# Patient Record
Sex: FEMALE | Race: WHITE | NOT HISPANIC OR LATINO | Employment: OTHER | URBAN - METROPOLITAN AREA
[De-identification: names, ages, dates, MRNs, and addresses within clinical notes are randomized per-mention and may not be internally consistent; named-entity substitution may affect disease eponyms.]

---

## 2023-04-12 PROBLEM — U07.1 CLINICAL DIAGNOSIS OF COVID-19: Status: ACTIVE | Noted: 2023-04-12

## 2023-04-12 PROBLEM — C44.311 BASAL CELL CARCINOMA (BCC) OF LEFT SIDE OF NOSE: Status: ACTIVE | Noted: 2023-04-12

## 2023-04-12 PROBLEM — D12.6 TUBULAR ADENOMA OF COLON: Status: ACTIVE | Noted: 2023-04-12

## 2023-04-12 PROBLEM — C44.622 SQUAMOUS CELL CARCINOMA OF RIGHT UPPER EXTREMITY: Status: ACTIVE | Noted: 2023-04-12

## 2023-04-12 PROBLEM — D37.8 NEOPLASM OF UNCERTAIN BEHAVIOR OF TAIL OF PANCREAS: Status: ACTIVE | Noted: 2023-04-12

## 2023-04-12 PROBLEM — R19.5 POSITIVE COLORECTAL CANCER SCREENING USING COLOGUARD TEST: Status: ACTIVE | Noted: 2023-04-12

## 2023-04-12 PROBLEM — G40.909 SEIZURE DISORDER (MULTI): Status: ACTIVE | Noted: 2023-04-12

## 2023-04-12 PROBLEM — M89.9 DISORDER OF BONE: Status: ACTIVE | Noted: 2023-04-12

## 2023-04-12 PROBLEM — D49.0 IPMN (INTRADUCTAL PAPILLARY MUCINOUS NEOPLASM): Status: ACTIVE | Noted: 2023-04-12

## 2023-04-12 PROBLEM — C91.11: Status: ACTIVE | Noted: 2023-04-12

## 2023-04-12 PROBLEM — G25.0 ESSENTIAL TREMOR: Status: ACTIVE | Noted: 2023-04-12

## 2023-04-12 PROBLEM — E66.3 OVERWEIGHT WITH BODY MASS INDEX (BMI) OF 28 TO 28.9 IN ADULT: Status: ACTIVE | Noted: 2023-04-12

## 2023-04-12 RX ORDER — DIVALPROEX SODIUM 500 MG/1
1 TABLET, DELAYED RELEASE ORAL DAILY
COMMUNITY
End: 2023-10-13 | Stop reason: SDUPTHER

## 2023-04-13 ENCOUNTER — OFFICE VISIT (OUTPATIENT)
Dept: PRIMARY CARE | Facility: CLINIC | Age: 73
End: 2023-04-13
Payer: MEDICARE

## 2023-04-13 VITALS
HEART RATE: 68 BPM | HEIGHT: 67 IN | WEIGHT: 218 LBS | DIASTOLIC BLOOD PRESSURE: 60 MMHG | BODY MASS INDEX: 34.21 KG/M2 | SYSTOLIC BLOOD PRESSURE: 104 MMHG | RESPIRATION RATE: 18 BRPM

## 2023-04-13 DIAGNOSIS — E66.09 CLASS 1 OBESITY DUE TO EXCESS CALORIES WITH SERIOUS COMORBIDITY AND BODY MASS INDEX (BMI) OF 34.0 TO 34.9 IN ADULT: ICD-10-CM

## 2023-04-13 DIAGNOSIS — G40.909 SEIZURE DISORDER (MULTI): ICD-10-CM

## 2023-04-13 DIAGNOSIS — D49.0 IPMN (INTRADUCTAL PAPILLARY MUCINOUS NEOPLASM): ICD-10-CM

## 2023-04-13 DIAGNOSIS — Z12.31 ENCOUNTER FOR SCREENING MAMMOGRAM FOR BREAST CANCER: ICD-10-CM

## 2023-04-13 DIAGNOSIS — E66.3 OVERWEIGHT WITH BODY MASS INDEX (BMI) OF 28 TO 28.9 IN ADULT: ICD-10-CM

## 2023-04-13 DIAGNOSIS — Z00.00 ROUTINE GENERAL MEDICAL EXAMINATION AT HEALTH CARE FACILITY: ICD-10-CM

## 2023-04-13 DIAGNOSIS — D12.6 TUBULAR ADENOMA OF COLON: ICD-10-CM

## 2023-04-13 DIAGNOSIS — G25.0 ESSENTIAL TREMOR: ICD-10-CM

## 2023-04-13 DIAGNOSIS — C91.11 CHRONIC LYMPHOCYTIC LEUKEMIA IN REMISSION (MULTI): ICD-10-CM

## 2023-04-13 DIAGNOSIS — Z00.00 MEDICARE ANNUAL WELLNESS VISIT, SUBSEQUENT: Primary | ICD-10-CM

## 2023-04-13 PROBLEM — E66.811 CLASS 1 OBESITY DUE TO EXCESS CALORIES WITH SERIOUS COMORBIDITY AND BODY MASS INDEX (BMI) OF 34.0 TO 34.9 IN ADULT: Status: ACTIVE | Noted: 2023-04-13

## 2023-04-13 PROBLEM — D37.8 NEOPLASM OF UNCERTAIN BEHAVIOR OF TAIL OF PANCREAS: Status: RESOLVED | Noted: 2023-04-12 | Resolved: 2023-04-13

## 2023-04-13 PROBLEM — M89.9 DISORDER OF BONE: Status: RESOLVED | Noted: 2023-04-12 | Resolved: 2023-04-13

## 2023-04-13 PROCEDURE — 99214 OFFICE O/P EST MOD 30 MIN: CPT | Performed by: INTERNAL MEDICINE

## 2023-04-13 PROCEDURE — 1170F FXNL STATUS ASSESSED: CPT | Performed by: INTERNAL MEDICINE

## 2023-04-13 PROCEDURE — 3008F BODY MASS INDEX DOCD: CPT | Performed by: INTERNAL MEDICINE

## 2023-04-13 PROCEDURE — 1159F MED LIST DOCD IN RCRD: CPT | Performed by: INTERNAL MEDICINE

## 2023-04-13 PROCEDURE — 1036F TOBACCO NON-USER: CPT | Performed by: INTERNAL MEDICINE

## 2023-04-13 PROCEDURE — G0442 ANNUAL ALCOHOL SCREEN 15 MIN: HCPCS | Performed by: INTERNAL MEDICINE

## 2023-04-13 PROCEDURE — G0439 PPPS, SUBSEQ VISIT: HCPCS | Performed by: INTERNAL MEDICINE

## 2023-04-13 PROCEDURE — 1160F RVW MEDS BY RX/DR IN RCRD: CPT | Performed by: INTERNAL MEDICINE

## 2023-04-13 PROCEDURE — 99497 ADVNCD CARE PLAN 30 MIN: CPT | Performed by: INTERNAL MEDICINE

## 2023-04-13 PROCEDURE — G0444 DEPRESSION SCREEN ANNUAL: HCPCS | Performed by: INTERNAL MEDICINE

## 2023-04-13 PROCEDURE — 99397 PER PM REEVAL EST PAT 65+ YR: CPT | Performed by: INTERNAL MEDICINE

## 2023-04-13 PROCEDURE — G0447 BEHAVIOR COUNSEL OBESITY 15M: HCPCS | Performed by: INTERNAL MEDICINE

## 2023-04-13 ASSESSMENT — ANXIETY QUESTIONNAIRES
3. WORRYING TOO MUCH ABOUT DIFFERENT THINGS: NOT AT ALL
GAD7 TOTAL SCORE: 0
5. BEING SO RESTLESS THAT IT IS HARD TO SIT STILL: NOT AT ALL
1. FEELING NERVOUS, ANXIOUS, OR ON EDGE: NOT AT ALL
IF YOU CHECKED OFF ANY PROBLEMS ON THIS QUESTIONNAIRE, HOW DIFFICULT HAVE THESE PROBLEMS MADE IT FOR YOU TO DO YOUR WORK, TAKE CARE OF THINGS AT HOME, OR GET ALONG WITH OTHER PEOPLE: NOT DIFFICULT AT ALL
2. NOT BEING ABLE TO STOP OR CONTROL WORRYING: NOT AT ALL
6. BECOMING EASILY ANNOYED OR IRRITABLE: NOT AT ALL
4. TROUBLE RELAXING: NOT AT ALL
7. FEELING AFRAID AS IF SOMETHING AWFUL MIGHT HAPPEN: NOT AT ALL

## 2023-04-13 ASSESSMENT — ACTIVITIES OF DAILY LIVING (ADL)
DOING_HOUSEWORK: INDEPENDENT
BATHING: INDEPENDENT
TAKING_MEDICATION: INDEPENDENT
MANAGING_FINANCES: INDEPENDENT
DRESSING: INDEPENDENT
GROCERY_SHOPPING: INDEPENDENT

## 2023-04-13 ASSESSMENT — ENCOUNTER SYMPTOMS
LOSS OF SENSATION IN FEET: 0
DEPRESSION: 0
OCCASIONAL FEELINGS OF UNSTEADINESS: 0

## 2023-04-13 ASSESSMENT — PATIENT HEALTH QUESTIONNAIRE - PHQ9
SUM OF ALL RESPONSES TO PHQ9 QUESTIONS 1 AND 2: 0
2. FEELING DOWN, DEPRESSED OR HOPELESS: NOT AT ALL
1. LITTLE INTEREST OR PLEASURE IN DOING THINGS: NOT AT ALL

## 2023-04-13 NOTE — ASSESSMENT & PLAN NOTE
CBC and evaluation to be completed by oncologist tomorrow in office examination reveals no lymphadenopathy no B cell reported symptoms of night sweats weight loss

## 2023-04-13 NOTE — ASSESSMENT & PLAN NOTE
Dietary education given Mediterranean diet suggested 150 minutes of regular exercise consideration for intermittent fasting referral to nutritional services

## 2023-04-13 NOTE — ASSESSMENT & PLAN NOTE
Continue propranolol 80 mg 1 capsule daily consider titration of medication to improve symptoms stable at this time

## 2023-04-13 NOTE — ASSESSMENT & PLAN NOTE
Advanced medical directives up-to-date screenings for depression and alcohol completed.  Screening mammogram ordered for October up-to-date with bone density normal bone density reported 2021 continue active surveillance for colon polyp.   screening for hepatitis C.  Order written for Shingrix vaccination to be obtained at pharmacy

## 2023-04-13 NOTE — ASSESSMENT & PLAN NOTE
Follow-up with MRI of pancreas and follow-up with pancreatic surgery stable at this time asymptomatic

## 2023-04-13 NOTE — PROGRESS NOTES
"Alcohol consumption becomes hazardous when consuming women [place] over 65 years old greater than 7 drinks per week or greater than 3 drinks per occasion for men greater than 14 drinks per week or greater than 4 drinks per occasion.  I spent 15 minutes screening for alcohol use.Depression and anxiety screening completed   PHQ9 score   GAD7 score   I spent 15 minutes obtaining and discussing depression screening using PHQ 2 questions with results documented in chart.  Screening using PHQ-9 and CEILO-7 scores were used for follow-up with treatment and referral plan discussed.      I spent greater than 15 minutes face-to-face with individual providing recommendations for nutrition choices and exercise plan to help achieve weight reductionI spent greater than 15 minutes discussing advance care planning including the explanation and discussion of advanced directives.  If patient does not have current up-to-date documents examples and information provided on how to create both living will and power of .  toolkit was given to patient and was encouraged to work out completing these documents.Subjective   Reason for Visit: Reina Knowles is an 72 y.o. female here for a Medicare Wellness visit.     Past Medical, Surgical, and Family History reviewed and updated in chart.    Reviewed all medications by prescribing practitioner or clinical pharmacist (such as prescriptions, OTCs, herbal therapies and supplements) and documented in the medical record.    HPI    Patient Care Team:  Dionte Lovett DO as PCP - General  Dionte Lovett DO as PCP - [employer] PCP     Review of Systems   All other systems reviewed and are negative.      Objective   Vitals:  /60 (BP Location: Left arm)   Pulse 68   Resp 18   Ht 1.702 m (5' 7\")   Wt 98.9 kg (218 lb)   BMI 34.14 kg/m²       Physical Exam  Vitals and nursing note reviewed.   Constitutional:       General: She is not in acute distress.     Appearance: " Normal appearance. She is well-developed. She is not toxic-appearing.   HENT:      Head: Normocephalic and atraumatic.      Right Ear: Tympanic membrane and external ear normal.      Left Ear: Tympanic membrane and external ear normal.      Nose: Nose normal.      Mouth/Throat:      Mouth: Mucous membranes are moist.      Pharynx: Oropharynx is clear. No oropharyngeal exudate or posterior oropharyngeal erythema.      Tonsils: No tonsillar exudate. 2+ on the right. 2+ on the left.   Eyes:      Extraocular Movements: Extraocular movements intact.      Conjunctiva/sclera: Conjunctivae normal.   Cardiovascular:      Rate and Rhythm: Normal rate and regular rhythm.      Pulses: Normal pulses.      Heart sounds: Normal heart sounds. No murmur heard.  Pulmonary:      Effort: Pulmonary effort is normal.      Breath sounds: Normal breath sounds.   Abdominal:      General: Abdomen is flat. Bowel sounds are normal.      Palpations: Abdomen is soft.   Musculoskeletal:      Cervical back: Neck supple.   Lymphadenopathy:      Cervical: No cervical adenopathy.   Skin:     General: Skin is warm and dry.      Findings: No rash.   Neurological:      Mental Status: She is alert. Mental status is at baseline.   Psychiatric:         Mood and Affect: Mood normal.         Behavior: Behavior normal.         Thought Content: Thought content normal.         Judgment: Judgment normal.         Assessment/Plan   Problem List Items Addressed This Visit          Nervous    Essential tremor    Current Assessment & Plan     Continue propranolol 80 mg 1 capsule daily consider titration of medication to improve symptoms stable at this time         Relevant Orders    BI mammo bilateral screening tomosynthesis    Comprehensive metabolic panel    Lipid Panel    Hepatitis C antibody    Seizure disorder (CMS/HCC)    Current Assessment & Plan     Stable on extended release Depakote 500 mg 1 tablet daily         Relevant Orders    BI mammo bilateral  screening tomosynthesis    Comprehensive metabolic panel    Lipid Panel    Hepatitis C antibody       Digestive    IPMN (intraductal papillary mucinous neoplasm)    Current Assessment & Plan     Follow-up with MRI of pancreas and follow-up with pancreatic surgery stable at this time asymptomatic         Tubular adenoma of colon    Current Assessment & Plan     Continue active surveillance colonoscopy in 3 years         Relevant Orders    BI mammo bilateral screening tomosynthesis    Comprehensive metabolic panel    Lipid Panel    Hepatitis C antibody       Endocrine/Metabolic    Class 1 obesity due to excess calories with serious comorbidity and body mass index (BMI) of 34.0 to 34.9 in adult    Current Assessment & Plan     Dietary education given Mediterranean diet suggested 150 minutes of regular exercise consideration for intermittent fasting referral to nutritional services         RESOLVED: Overweight with body mass index (BMI) of 28 to 28.9 in adult    Relevant Orders    BI mammo bilateral screening tomosynthesis    Comprehensive metabolic panel    Lipid Panel    Hepatitis C antibody       Other    Chronic lymphocytic leukemia in remission (CMS/HCC)    Current Assessment & Plan     CBC and evaluation to be completed by oncologist tomorrow in office examination reveals no lymphadenopathy no B cell reported symptoms of night sweats weight loss         Relevant Orders    BI mammo bilateral screening tomosynthesis    Comprehensive metabolic panel    Lipid Panel    Hepatitis C antibody    Medicare annual wellness visit, subsequent - Primary    Current Assessment & Plan     Advanced medical directives up-to-date screenings for depression and alcohol completed.  Screening mammogram ordered for October up-to-date with bone density normal bone density reported 2021 continue active surveillance for colon polyp.   screening for hepatitis C.  Order written for Shingrix vaccination to be obtained at pharmacy          Relevant Orders    BI mammo bilateral screening tomosynthesis    Comprehensive metabolic panel    Lipid Panel    Hepatitis C antibody     Other Visit Diagnoses       Encounter for screening mammogram for breast cancer        Relevant Orders    BI mammo bilateral screening tomosynthesis    Routine general medical examination at health care facility

## 2023-04-13 NOTE — PROGRESS NOTES
"Subjective   Reason for Visit: Reina Knowles is an 72 y.o. female here for a Medicare Wellness visit.     Past Medical, Surgical, and Family History reviewed and updated in chart.    Reviewed all medications by prescribing practitioner or clinical pharmacist (such as prescriptions, OTCs, herbal therapies and supplements) and documented in the medical record.    HPI    Patient Care Team:  Dionte Lovett DO as PCP - General  Dionte Lovett DO as PCP - [employer] PCP     Review of Systems    Objective   Vitals:  /60 (BP Location: Left arm)   Pulse 68   Resp 18   Ht 1.702 m (5' 7\")   Wt 98.9 kg (218 lb)   BMI 34.14 kg/m²       Physical Exam    Assessment/Plan   Problem List Items Addressed This Visit          Nervous    Essential tremor    Relevant Orders    BI mammo bilateral screening tomosynthesis    CBC and Auto Differential    Comprehensive metabolic panel    Lipid Panel    Hepatitis C antibody    MR pancreas w and wo IV contrast    Seizure disorder (CMS/HCC)    Relevant Orders    BI mammo bilateral screening tomosynthesis    CBC and Auto Differential    Comprehensive metabolic panel    Lipid Panel    Hepatitis C antibody    MR pancreas w and wo IV contrast       Digestive    IPMN (intraductal papillary mucinous neoplasm)    Relevant Orders    MR pancreas w and wo IV contrast    Tubular adenoma of colon    Relevant Orders    BI mammo bilateral screening tomosynthesis    CBC and Auto Differential    Comprehensive metabolic panel    Lipid Panel    Hepatitis C antibody    MR pancreas w and wo IV contrast       Endocrine/Metabolic    Overweight with body mass index (BMI) of 28 to 28.9 in adult    Relevant Orders    BI mammo bilateral screening tomosynthesis    CBC and Auto Differential    Comprehensive metabolic panel    Lipid Panel    Hepatitis C antibody    MR pancreas w and wo IV contrast       Other    Chronic lymphocytic leukemia in remission (CMS/HCC)    Relevant Orders    BI " mammo bilateral screening tomosynthesis    CBC and Auto Differential    Comprehensive metabolic panel    Lipid Panel    Hepatitis C antibody    MR pancreas w and wo IV contrast    Medicare annual wellness visit, subsequent - Primary    Relevant Orders    BI mammo bilateral screening tomosynthesis    CBC and Auto Differential    Comprehensive metabolic panel    Lipid Panel    Hepatitis C antibody    MR pancreas w and wo IV contrast     Other Visit Diagnoses       Encounter for screening mammogram for breast cancer        Relevant Orders    BI mammo bilateral screening tomosynthesis

## 2023-04-16 DIAGNOSIS — G25.0 ESSENTIAL TREMOR: ICD-10-CM

## 2023-04-17 RX ORDER — PROPRANOLOL HYDROCHLORIDE 80 MG/1
CAPSULE, EXTENDED RELEASE ORAL
Qty: 90 CAPSULE | Refills: 3 | Status: SHIPPED | OUTPATIENT
Start: 2023-04-17 | End: 2023-10-13

## 2023-06-01 ENCOUNTER — TELEPHONE (OUTPATIENT)
Dept: PRIMARY CARE | Facility: CLINIC | Age: 73
End: 2023-06-01
Payer: MEDICARE

## 2023-06-01 DIAGNOSIS — I87.2 CHRONIC VENOUS INSUFFICIENCY OF LOWER EXTREMITY: Primary | ICD-10-CM

## 2023-06-01 RX ORDER — FUROSEMIDE 20 MG/1
20 TABLET ORAL DAILY
Qty: 30 TABLET | Refills: 3 | Status: SHIPPED | OUTPATIENT
Start: 2023-06-01 | End: 2023-06-23

## 2023-06-01 NOTE — TELEPHONE ENCOUNTER
*voicemail    Patient called in asking if you could send her in something for water retention in her feet and legs, shes barely able to get her shoes on, please advise    [facility]

## 2023-06-23 DIAGNOSIS — I87.2 CHRONIC VENOUS INSUFFICIENCY OF LOWER EXTREMITY: ICD-10-CM

## 2023-06-23 RX ORDER — FUROSEMIDE 20 MG/1
TABLET ORAL
Qty: 30 TABLET | Refills: 3 | Status: SHIPPED | OUTPATIENT
Start: 2023-06-23 | End: 2023-09-25

## 2023-09-08 PROBLEM — D50.9 IRON DEFICIENCY ANEMIA: Status: ACTIVE | Noted: 2023-09-08

## 2023-09-25 DIAGNOSIS — I87.2 CHRONIC VENOUS INSUFFICIENCY OF LOWER EXTREMITY: ICD-10-CM

## 2023-09-25 RX ORDER — FUROSEMIDE 20 MG/1
TABLET ORAL
Qty: 90 TABLET | Refills: 1 | Status: SHIPPED | OUTPATIENT
Start: 2023-09-25 | End: 2024-03-19

## 2023-10-10 ENCOUNTER — LAB (OUTPATIENT)
Dept: LAB | Facility: LAB | Age: 73
End: 2023-10-10
Payer: MEDICARE

## 2023-10-10 DIAGNOSIS — G25.0 ESSENTIAL TREMOR: ICD-10-CM

## 2023-10-10 DIAGNOSIS — G40.909 SEIZURE DISORDER (MULTI): ICD-10-CM

## 2023-10-10 DIAGNOSIS — C91.11 CHRONIC LYMPHOCYTIC LEUKEMIA IN REMISSION (MULTI): ICD-10-CM

## 2023-10-10 DIAGNOSIS — Z00.00 MEDICARE ANNUAL WELLNESS VISIT, SUBSEQUENT: ICD-10-CM

## 2023-10-10 DIAGNOSIS — E66.3 OVERWEIGHT WITH BODY MASS INDEX (BMI) OF 28 TO 28.9 IN ADULT: ICD-10-CM

## 2023-10-10 DIAGNOSIS — D12.6 TUBULAR ADENOMA OF COLON: ICD-10-CM

## 2023-10-10 LAB
ALBUMIN SERPL BCP-MCNC: 3.8 G/DL (ref 3.4–5)
ALP SERPL-CCNC: 52 U/L (ref 33–136)
ALT SERPL W P-5'-P-CCNC: 9 U/L (ref 7–45)
ANION GAP SERPL CALC-SCNC: 11 MMOL/L (ref 10–20)
AST SERPL W P-5'-P-CCNC: 16 U/L (ref 9–39)
BILIRUB SERPL-MCNC: 0.8 MG/DL (ref 0–1.2)
BUN SERPL-MCNC: 19 MG/DL (ref 6–23)
CALCIUM SERPL-MCNC: 9 MG/DL (ref 8.6–10.3)
CHLORIDE SERPL-SCNC: 103 MMOL/L (ref 98–107)
CHOLEST SERPL-MCNC: 217 MG/DL (ref 0–199)
CHOLESTEROL/HDL RATIO: 3.4
CO2 SERPL-SCNC: 30 MMOL/L (ref 21–32)
CREAT SERPL-MCNC: 0.99 MG/DL (ref 0.5–1.05)
GFR SERPL CREATININE-BSD FRML MDRD: 61 ML/MIN/1.73M*2
GLUCOSE SERPL-MCNC: 87 MG/DL (ref 74–99)
HCV AB SER QL: NONREACTIVE
HDLC SERPL-MCNC: 63.6 MG/DL
LDLC SERPL CALC-MCNC: 138 MG/DL (ref 140–190)
NON HDL CHOLESTEROL: 153 MG/DL (ref 0–149)
POTASSIUM SERPL-SCNC: 4.2 MMOL/L (ref 3.5–5.3)
PROT SERPL-MCNC: 5.9 G/DL (ref 6.4–8.2)
SODIUM SERPL-SCNC: 140 MMOL/L (ref 136–145)
TRIGL SERPL-MCNC: 76 MG/DL (ref 0–149)
VLDL: 15 MG/DL (ref 0–40)

## 2023-10-10 PROCEDURE — 80053 COMPREHEN METABOLIC PANEL: CPT

## 2023-10-10 PROCEDURE — 36415 COLL VENOUS BLD VENIPUNCTURE: CPT

## 2023-10-10 PROCEDURE — 80061 LIPID PANEL: CPT

## 2023-10-10 PROCEDURE — 86803 HEPATITIS C AB TEST: CPT

## 2023-10-11 DIAGNOSIS — C91.11 CHRONIC LYMPHOCYTIC LEUKEMIA IN REMISSION (MULTI): ICD-10-CM

## 2023-10-12 ENCOUNTER — OFFICE VISIT (OUTPATIENT)
Dept: HEMATOLOGY/ONCOLOGY | Facility: CLINIC | Age: 73
End: 2023-10-12
Payer: MEDICARE

## 2023-10-12 ENCOUNTER — LAB (OUTPATIENT)
Dept: LAB | Facility: HOSPITAL | Age: 73
End: 2023-10-12
Payer: MEDICARE

## 2023-10-12 VITALS
WEIGHT: 214.95 LBS | DIASTOLIC BLOOD PRESSURE: 78 MMHG | TEMPERATURE: 97.5 F | SYSTOLIC BLOOD PRESSURE: 117 MMHG | HEIGHT: 67 IN | RESPIRATION RATE: 18 BRPM | OXYGEN SATURATION: 97 % | HEART RATE: 68 BPM | BODY MASS INDEX: 33.74 KG/M2

## 2023-10-12 DIAGNOSIS — C91.11 CHRONIC LYMPHOCYTIC LEUKEMIA IN REMISSION (MULTI): ICD-10-CM

## 2023-10-12 DIAGNOSIS — C91.11 CHRONIC LYMPHOCYTIC LEUKEMIA IN REMISSION (MULTI): Primary | ICD-10-CM

## 2023-10-12 DIAGNOSIS — Z23 FLU VACCINE NEED: ICD-10-CM

## 2023-10-12 LAB
BASOPHILS # BLD AUTO: 0.03 X10*3/UL (ref 0–0.1)
BASOPHILS NFR BLD AUTO: 0.8 %
EOSINOPHIL # BLD AUTO: 0.12 X10*3/UL (ref 0–0.4)
EOSINOPHIL NFR BLD AUTO: 3.2 %
ERYTHROCYTE [DISTWIDTH] IN BLOOD BY AUTOMATED COUNT: 14.5 % (ref 11.5–14.5)
HCT VFR BLD AUTO: 41.2 % (ref 36–46)
HGB BLD-MCNC: 13.3 G/DL (ref 12–16)
IMM GRANULOCYTES # BLD AUTO: 0.01 X10*3/UL (ref 0–0.5)
IMM GRANULOCYTES NFR BLD AUTO: 0.3 % (ref 0–0.9)
LYMPHOCYTES # BLD AUTO: 0.75 X10*3/UL (ref 0.8–3)
LYMPHOCYTES NFR BLD AUTO: 20 %
MCH RBC QN AUTO: 27.4 PG (ref 26–34)
MCHC RBC AUTO-ENTMCNC: 32.3 G/DL (ref 32–36)
MCV RBC AUTO: 85 FL (ref 80–100)
MONOCYTES # BLD AUTO: 0.59 X10*3/UL (ref 0.05–0.8)
MONOCYTES NFR BLD AUTO: 15.7 %
NEUTROPHILS # BLD AUTO: 2.25 X10*3/UL (ref 1.6–5.5)
NEUTROPHILS NFR BLD AUTO: 60 %
PLATELET # BLD AUTO: 200 X10*3/UL (ref 150–450)
PMV BLD AUTO: 8.8 FL (ref 7.5–11.5)
RBC # BLD AUTO: 4.85 X10*6/UL (ref 4–5.2)
WBC # BLD AUTO: 3.8 X10*3/UL (ref 4.4–11.3)

## 2023-10-12 PROCEDURE — 99213 OFFICE O/P EST LOW 20 MIN: CPT | Performed by: INTERNAL MEDICINE

## 2023-10-12 PROCEDURE — 1036F TOBACCO NON-USER: CPT | Performed by: INTERNAL MEDICINE

## 2023-10-12 PROCEDURE — 1159F MED LIST DOCD IN RCRD: CPT | Performed by: INTERNAL MEDICINE

## 2023-10-12 PROCEDURE — 1160F RVW MEDS BY RX/DR IN RCRD: CPT | Performed by: INTERNAL MEDICINE

## 2023-10-12 PROCEDURE — 85025 COMPLETE CBC W/AUTO DIFF WBC: CPT

## 2023-10-12 PROCEDURE — 3008F BODY MASS INDEX DOCD: CPT | Performed by: INTERNAL MEDICINE

## 2023-10-12 PROCEDURE — 36415 COLL VENOUS BLD VENIPUNCTURE: CPT

## 2023-10-12 PROCEDURE — 1125F AMNT PAIN NOTED PAIN PRSNT: CPT | Performed by: INTERNAL MEDICINE

## 2023-10-12 ASSESSMENT — ENCOUNTER SYMPTOMS: OCCASIONAL FEELINGS OF UNSTEADINESS: 0

## 2023-10-12 ASSESSMENT — PAIN SCALES - GENERAL: PAINLEVEL: 4

## 2023-10-12 NOTE — PATIENT INSTRUCTIONS
Follow up in 6 months with Dr. Zhao.    Lab appointments are no longer being scheduled. Please arrive at least 15 minutes prior to your appointment for lab work.

## 2023-10-12 NOTE — PROGRESS NOTES
Patient Visit Information:   Visit Type: Follow Up Visit      Cancer History:   Treatment Synopsis:    Chronic lymphocytic leukemia diagnosed 2010.   2010: Bone marrow showed chronic lymphocytic leukemia/small lymphocytic lymphoma with CD19,CD20, CD5, and CD23 positive and lambda immunoglobulin light chains.   CD38 was moderate. 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  2015: Developed significant adenopathy, B symptoms.  July-2015: Fludarabine/rituximab x 6 cycles.  August-2019: Bendamustine/rituximab x 4 cycles.  Remission.  10/19/2021: WBC 5.2.  ALC 0.3.  Hemoglobin 12.2.  Platelets 138,000.   4/15/2022: WBC 4.7.  A.7.  Hemoglobin 12.1.  Platelets 156,000.  In remission.  10/15/2022: WBC 4.2  A.13 Hemoglobin 11.8.  Platelets 161,000.   2023: WBC 4.5.  ALC 0.9.  Hemoglobin 11.0.  Platelets 167,000.  CMP, iron studies: Normal.   10/12/23 , WBC count 3.8, hemoglobin 13.3, platelets 200     History of Present Illness:      ID Statement:    REINA KNOWLES is a 72 year old Female        Chief Complaint: Office visit for treatment of chronic  lymphocytic leukemia.   Interval History:    Reina Knowles is a 71-year-old female, with history of CLL, who presents for continuation of care.  She is doing very well, no B symptoms.  Lower GI symptoms, pancreatic lesions have been stable which are documented by previous MRI done in 2023    Past medical history: CLL diagnosed , begin treatment with FIR  and received 4 cycles BR , as described above.  In remission.  History of hypogammaglobulinemia, receives IVIG infusions.  Positive Cologuard test, pancreatic lesion (monitored  by Dr. Morgan), neurologic condition, cataract surgery.  COVID-19 infection, long-haul, 2020-2021. Colonoscopy 22- two polyps (benign)- recommended follow-up in 3-5 years.      Review of Systems:   Review of Systems:    Constitutional: Feeling well, no fatigue or weakness.  Head and  neck: No headaches or dizziness.     HEENT: No sore throat or sinusitis.  Hearing is normal eyesight is good.  Cardiac: No chest pain or palpitations.  Pulmonary: no cough or shortness of breath.  GI: Appetite is good and weight stable.  No constipation or diarrhea.  No abdominal pain  Genitourinary: No frequency or urgency.  No polyuria or dysuria.  Musculocutaneous: No arthritis.  Endocrine: No diabetes no thyroid disease.  Skin: No rash or itching.  Neuromuscular : no fainting or dizziness.  No history of convulsions.  No tingling or numbness.           Allergies and Intolerances:       Allergies:         azithromycin: Drug, Unknown, Active     Outpatient Medication Profile:  * Patient Currently Takes Medications as of 14-Apr-2023 12:49 documented in Structured Notes         Depakote  mg oral tablet, extended release : Last Dose Taken:  , 1 tab(s) orally once a day         propranolol 40 mg oral tablet: Last Dose Taken:  , 1 tab(s) orally 2  times a day             Medical History:         Seizure: ICD-10: R56.9, Status: Active         COVID-19: ICD-10: U07.1, Status: Active         Hypogammaglobulinemia: ICD-10: D80.1, Status: Active         Chronic cough: ICD-10: R05, Status: Active         Chronic sinusitis: ICD-10: J32.9, Status: Active         Neutropenia: ICD-10: D70.9, Status: Active         Chronic lymphocytic leukemia: ICD-10: C91.10, Status: Active       Surg History:         History of lymph node biopsy: ICD-10: Z98.89, Status: Active     Family History: Family history of neoplasm of brain  (Maternal Grandmother Age Unknown)      Social History:   Social Substance History:  ·  Smoking Status never smoker   ·  Additional History     Social history: She works at a bank.(1)           Vitals and Measurements:   Vitals: Temp: 36.2  HR: 58  RR: 16  BP: 118/64  SPO2%:   98   Measurements: HT(cm): 167.9  WT(kg): 98.4  BSA: 2.14   BMI:  34.9      Physical Exam:      Constitutional: Well developed,  awake/alert/oriented  x3.   Eyes: PERRL, EOMI, clear sclera.   ENMT: No external lesions noted.   Head/Neck: Neck with normal movement.  No mass, adenopathy  or tenderness.  No JVD. Trachea midline.   Respiratory/Thorax: Thorax symmetric. Clear to auscultation.   No wheezes, rales, rhonchi.   Cardiovascular: Regular, rate and rhythm. No murmur.   No rubs or gallops.   Gastrointestinal: Nondistended.  Normal bowel sounds.   Soft, non-tender. No rebound or guarding. No masses palpable.  No palpable hepatomegaly, splenomegaly.   Musculoskeletal: ROM intact.  No significant joint  swelling. Adequate strength. No significant deformity.   Extremities: Mild distal lower extremity edema, chronic,  stable per patient.   Neurological: Alert and oriented x3. Senses and cranial  nerves grossly intact. No focal motor deficits noted. No focal sensory deficits.   Lymphatic: No palpably significant lymphadenopathy  in the neck, supraclavicular, axillary areas or other areas.   Psychological: Appropriate mood and behavior.   Skin: Warm and dry, no rashes, no suspicious lesions.         Lab Results:          Assessment and Plan:   Assessment:    4.  Multiple medical problems.   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  In remission.      2.  Abnormal Cologuard test. Colonoscopy 4/20/2022- 2 polyps (benign)- recommended follow-up 3-5 years.     3.  History of pancreatic lesion, reportedly stable.      4.  Multiple medical problems.       5.  Slowly progressing anemia, rule out due to chronic disease, occult blood loss or bone marrow insufficiency.     Plan: In terms of chronic lymphocytic leukemia no evidence of progression of disease, lab result discussed with patient follow-up after 6 months.     Total time =20 minutes. 50% or more of this time was spent in counseling and/or coordination of care including reviewing medical history/radiology/labs, examining patient, formulating outlined plan  with  "team, and discussing plan with patient/family.  I reviewed patient's chart including but not limited to labs, imaging, surgical/procedure notes, pathology, hospital notes, doctor's notes.                                            Patient Instructions:      Instructions Type: nutrition            Note Recipients: Ej Morgan MD Yanelli, Emmanuel, DO   Select \"Yes\" when ready to send to Provider(s) Listed Above:  Note sent to providers named above     "

## 2023-10-13 ENCOUNTER — OFFICE VISIT (OUTPATIENT)
Dept: PRIMARY CARE | Facility: CLINIC | Age: 73
End: 2023-10-13
Payer: MEDICARE

## 2023-10-13 VITALS
HEART RATE: 66 BPM | DIASTOLIC BLOOD PRESSURE: 70 MMHG | SYSTOLIC BLOOD PRESSURE: 122 MMHG | HEIGHT: 67 IN | WEIGHT: 215 LBS | BODY MASS INDEX: 33.74 KG/M2

## 2023-10-13 DIAGNOSIS — E66.09 CLASS 1 OBESITY DUE TO EXCESS CALORIES WITH SERIOUS COMORBIDITY AND BODY MASS INDEX (BMI) OF 34.0 TO 34.9 IN ADULT: ICD-10-CM

## 2023-10-13 DIAGNOSIS — D49.0 IPMN (INTRADUCTAL PAPILLARY MUCINOUS NEOPLASM): ICD-10-CM

## 2023-10-13 DIAGNOSIS — G40.909 SEIZURE DISORDER (MULTI): ICD-10-CM

## 2023-10-13 DIAGNOSIS — D80.1 NONFAMILIAL HYPOGAMMAGLOBULINEMIA (MULTI): ICD-10-CM

## 2023-10-13 DIAGNOSIS — Z12.31 ENCOUNTER FOR SCREENING MAMMOGRAM FOR BREAST CANCER: ICD-10-CM

## 2023-10-13 DIAGNOSIS — E66.09 CLASS 1 OBESITY DUE TO EXCESS CALORIES WITH SERIOUS COMORBIDITY AND BODY MASS INDEX (BMI) OF 33.0 TO 33.9 IN ADULT: ICD-10-CM

## 2023-10-13 DIAGNOSIS — C91.11 CHRONIC LYMPHOCYTIC LEUKEMIA IN REMISSION (MULTI): ICD-10-CM

## 2023-10-13 DIAGNOSIS — G25.0 ESSENTIAL TREMOR: Primary | ICD-10-CM

## 2023-10-13 PROBLEM — D50.9 IRON DEFICIENCY ANEMIA: Status: RESOLVED | Noted: 2023-09-08 | Resolved: 2023-10-13

## 2023-10-13 PROBLEM — R19.5 POSITIVE COLORECTAL CANCER SCREENING USING COLOGUARD TEST: Status: RESOLVED | Noted: 2023-04-12 | Resolved: 2023-10-13

## 2023-10-13 PROCEDURE — 3008F BODY MASS INDEX DOCD: CPT | Performed by: INTERNAL MEDICINE

## 2023-10-13 PROCEDURE — 1036F TOBACCO NON-USER: CPT | Performed by: INTERNAL MEDICINE

## 2023-10-13 PROCEDURE — 1125F AMNT PAIN NOTED PAIN PRSNT: CPT | Performed by: INTERNAL MEDICINE

## 2023-10-13 PROCEDURE — 1160F RVW MEDS BY RX/DR IN RCRD: CPT | Performed by: INTERNAL MEDICINE

## 2023-10-13 PROCEDURE — 1159F MED LIST DOCD IN RCRD: CPT | Performed by: INTERNAL MEDICINE

## 2023-10-13 PROCEDURE — 99213 OFFICE O/P EST LOW 20 MIN: CPT | Performed by: INTERNAL MEDICINE

## 2023-10-13 RX ORDER — PROPRANOLOL HYDROCHLORIDE 120 MG/1
120 CAPSULE, EXTENDED RELEASE ORAL DAILY
Qty: 90 CAPSULE | Refills: 3 | Status: SHIPPED | OUTPATIENT
Start: 2023-10-13 | End: 2024-10-12

## 2023-10-13 RX ORDER — DIVALPROEX SODIUM 500 MG/1
500 TABLET, FILM COATED, EXTENDED RELEASE ORAL DAILY
COMMUNITY
End: 2023-12-19 | Stop reason: SDUPTHER

## 2023-10-13 NOTE — ASSESSMENT & PLAN NOTE
Discussed possible use of GLP-1 agonist therapy not covered under insurance for morbid obesity with BMI of 33 lab work did not suggest diabetes mellitus continue to look at other options continue with diet and lifestyle changes as tolerated

## 2023-10-13 NOTE — ASSESSMENT & PLAN NOTE
CBC stable with white count of 3600 no anemia or thrombocytopenia noted no lymphadenopathy or B symptoms on exam continue to monitor with oncologist 6-month basis

## 2023-10-13 NOTE — PROGRESS NOTES
"Subjective   Patient ID: Reina Knowles is a 72 y.o. female who presents for Follow-up.    HPI     Review of Systems    Objective   /70 (BP Location: Left arm, Patient Position: Sitting)   Pulse 66   Ht 1.702 m (5' 7\")   Wt 97.5 kg (215 lb)   BMI 33.67 kg/m²     Physical Exam    Assessment/Plan          "

## 2023-10-13 NOTE — PROGRESS NOTES
"Subjective   Reason for Visit: Reina Knowles is an 72 y.o. female here for a Medicare Wellness visit.     Past Medical, Surgical, and Family History reviewed and updated in chart.    Reviewed all medications by prescribing practitioner or clinical pharmacist (such as prescriptions, OTCs, herbal therapies and supplements) and documented in the medical record.    HPI    Patient Care Team:  Dionte Lovett DO as PCP - General  Dionte Lovett DO as PCP - [employer] PCP  Maximo Zhao MD as Consulting Physician (Hematology and Oncology)     Review of Systems   All other systems reviewed and are negative.      Objective   Vitals:  /70 (BP Location: Left arm, Patient Position: Sitting)   Pulse 66   Ht 1.702 m (5' 7\")   Wt 97.5 kg (215 lb)   BMI 33.67 kg/m²       Physical Exam  Vitals and nursing note reviewed.   Constitutional:       General: She is not in acute distress.     Appearance: Normal appearance. She is well-developed. She is obese. She is not toxic-appearing.   HENT:      Head: Normocephalic and atraumatic.      Right Ear: Tympanic membrane and external ear normal.      Left Ear: Tympanic membrane and external ear normal.      Nose: Nose normal.      Mouth/Throat:      Mouth: Mucous membranes are moist.      Pharynx: Oropharynx is clear. No oropharyngeal exudate or posterior oropharyngeal erythema.      Tonsils: No tonsillar exudate. 2+ on the right. 2+ on the left.   Eyes:      Extraocular Movements: Extraocular movements intact.      Conjunctiva/sclera: Conjunctivae normal.   Cardiovascular:      Rate and Rhythm: Normal rate and regular rhythm.      Pulses: Normal pulses.      Heart sounds: Normal heart sounds. No murmur heard.  Pulmonary:      Effort: Pulmonary effort is normal.      Breath sounds: Normal breath sounds.   Abdominal:      General: Abdomen is flat. Bowel sounds are normal.      Palpations: Abdomen is soft.   Musculoskeletal:      Cervical back: Neck supple. "   Lymphadenopathy:      Cervical: No cervical adenopathy.   Skin:     General: Skin is warm and dry.      Findings: No rash.   Neurological:      Mental Status: She is alert. Mental status is at baseline.   Psychiatric:         Mood and Affect: Mood normal.         Behavior: Behavior normal.         Thought Content: Thought content normal.         Judgment: Judgment normal.         Assessment/Plan   Problem List Items Addressed This Visit       Chronic lymphocytic leukemia in remission (CMS/[facility])    Current Assessment & Plan     CBC stable with white count of 3600 no anemia or thrombocytopenia noted no lymphadenopathy or B symptoms on exam continue to monitor with oncologist 6-month basis         Relevant Medications    propranolol LA (Inderal LA) 120 mg 24 hr capsule    Other Relevant Orders    Follow Up In [facility] - PCP - Established    Essential tremor - Primary    Current Assessment & Plan     Titrate propranolol LA 80 mg to 120 mg and reassess response with intention tremor next visit         Relevant Medications    propranolol LA (Inderal LA) 120 mg 24 hr capsule    Other Relevant Orders    Follow Up In [facility] - PCP - Established    IPMN (intraductal papillary mucinous neoplasm)    Current Assessment & Plan     Recent evaluation and April 2023 stable continue with annual follow-up asymptomatic         Relevant Medications    propranolol LA (Inderal LA) 120 mg 24 hr capsule    Other Relevant Orders    Follow Up In [facility] - PCP - Established    Seizure disorder (CMS/[facility])    Current Assessment & Plan     Stable continue seizure prophylaxis with Depakote  mg daily         Relevant Medications    propranolol LA (Inderal LA) 120 mg 24 hr capsule    Other Relevant Orders    Follow Up In [facility] - PCP - Established    Class 1 obesity due to excess calories with serious comorbidity and body mass index (BMI) of 33.0 to 33.9 in adult    Current Assessment &  Plan     Discussed possible use of GLP-1 agonist therapy not covered under insurance for morbid obesity with BMI of 33 lab work did not suggest diabetes mellitus continue to look at other options continue with diet and lifestyle changes as tolerated         Relevant Medications    propranolol LA (Inderal LA) 120 mg 24 hr capsule    Other Relevant Orders    Follow Up In [facility] - PCP - Established    Nonfamilial hypogammaglobulinemia (CMS/HCC)    Current Assessment & Plan     Continue to monitor light chain activity with oncology         Relevant Medications    propranolol LA (Inderal LA) 120 mg 24 hr capsule    Other Relevant Orders    Follow Up In [facility] - PCP - Established     Other Visit Diagnoses       Encounter for screening mammogram for breast cancer

## 2023-12-19 DIAGNOSIS — G40.909 SEIZURE DISORDER (MULTI): Primary | ICD-10-CM

## 2023-12-19 RX ORDER — DIVALPROEX SODIUM 500 MG/1
500 TABLET, FILM COATED, EXTENDED RELEASE ORAL DAILY
Qty: 90 TABLET | Refills: 3 | Status: SHIPPED | OUTPATIENT
Start: 2023-12-19

## 2024-02-05 ENCOUNTER — HOSPITAL ENCOUNTER (OUTPATIENT)
Dept: RADIOLOGY | Facility: CLINIC | Age: 74
Discharge: HOME | End: 2024-02-05
Payer: MEDICARE

## 2024-02-05 VITALS — WEIGHT: 206 LBS | HEIGHT: 67 IN | BODY MASS INDEX: 32.33 KG/M2

## 2024-02-05 DIAGNOSIS — D12.6 TUBULAR ADENOMA OF COLON: ICD-10-CM

## 2024-02-05 DIAGNOSIS — E66.3 OVERWEIGHT WITH BODY MASS INDEX (BMI) OF 28 TO 28.9 IN ADULT: ICD-10-CM

## 2024-02-05 DIAGNOSIS — G25.0 ESSENTIAL TREMOR: ICD-10-CM

## 2024-02-05 DIAGNOSIS — Z12.31 ENCOUNTER FOR SCREENING MAMMOGRAM FOR BREAST CANCER: ICD-10-CM

## 2024-02-05 DIAGNOSIS — Z00.00 MEDICARE ANNUAL WELLNESS VISIT, SUBSEQUENT: ICD-10-CM

## 2024-02-05 DIAGNOSIS — G40.909 SEIZURE DISORDER (MULTI): ICD-10-CM

## 2024-02-05 DIAGNOSIS — C91.11 CHRONIC LYMPHOCYTIC LEUKEMIA IN REMISSION (MULTI): ICD-10-CM

## 2024-02-05 PROCEDURE — 77063 BREAST TOMOSYNTHESIS BI: CPT | Mod: BILATERAL PROCEDURE

## 2024-02-05 PROCEDURE — 77067 SCR MAMMO BI INCL CAD: CPT | Mod: BILATERAL PROCEDURE

## 2024-02-05 PROCEDURE — 77067 SCR MAMMO BI INCL CAD: CPT

## 2024-03-07 ENCOUNTER — APPOINTMENT (OUTPATIENT)
Dept: RADIOLOGY | Facility: HOSPITAL | Age: 74
End: 2024-03-07
Payer: MEDICARE

## 2024-03-12 ENCOUNTER — HOSPITAL ENCOUNTER (OUTPATIENT)
Dept: RADIOLOGY | Facility: HOSPITAL | Age: 74
Discharge: HOME | End: 2024-03-12
Payer: MEDICARE

## 2024-03-12 DIAGNOSIS — H90.A22 SENSORINEURAL HEARING LOSS, UNILATERAL, LEFT EAR, WITH RESTRICTED HEARING ON THE CONTRALATERAL SIDE: ICD-10-CM

## 2024-03-12 PROCEDURE — 70553 MRI BRAIN STEM W/O & W/DYE: CPT | Performed by: RADIOLOGY

## 2024-03-12 PROCEDURE — A9575 INJ GADOTERATE MEGLUMI 0.1ML: HCPCS | Performed by: OTOLARYNGOLOGY

## 2024-03-12 PROCEDURE — 70553 MRI BRAIN STEM W/O & W/DYE: CPT

## 2024-03-12 PROCEDURE — 2550000001 HC RX 255 CONTRASTS: Performed by: OTOLARYNGOLOGY

## 2024-03-12 RX ORDER — GADOTERATE MEGLUMINE 376.9 MG/ML
0.2 INJECTION INTRAVENOUS
Status: COMPLETED | OUTPATIENT
Start: 2024-03-12 | End: 2024-03-12

## 2024-03-12 RX ADMIN — GADOTERATE MEGLUMINE 19 ML: 376.9 INJECTION INTRAVENOUS at 17:07

## 2024-03-19 DIAGNOSIS — I87.2 CHRONIC VENOUS INSUFFICIENCY OF LOWER EXTREMITY: ICD-10-CM

## 2024-03-19 RX ORDER — FUROSEMIDE 20 MG/1
TABLET ORAL
Qty: 90 TABLET | Refills: 1 | Status: SHIPPED | OUTPATIENT
Start: 2024-03-19

## 2024-04-10 ENCOUNTER — APPOINTMENT (OUTPATIENT)
Dept: LAB | Facility: LAB | Age: 74
End: 2024-04-10
Payer: MEDICARE

## 2024-04-12 ENCOUNTER — OFFICE VISIT (OUTPATIENT)
Dept: PRIMARY CARE | Facility: CLINIC | Age: 74
End: 2024-04-12
Payer: MEDICARE

## 2024-04-12 VITALS
WEIGHT: 212 LBS | BODY MASS INDEX: 33.27 KG/M2 | DIASTOLIC BLOOD PRESSURE: 60 MMHG | HEIGHT: 67 IN | SYSTOLIC BLOOD PRESSURE: 110 MMHG | HEART RATE: 64 BPM

## 2024-04-12 DIAGNOSIS — D12.6 TUBULAR ADENOMA OF COLON: ICD-10-CM

## 2024-04-12 DIAGNOSIS — C44.622 SQUAMOUS CELL CARCINOMA OF RIGHT UPPER EXTREMITY: ICD-10-CM

## 2024-04-12 DIAGNOSIS — C91.11 CHRONIC LYMPHOCYTIC LEUKEMIA IN REMISSION (MULTI): ICD-10-CM

## 2024-04-12 DIAGNOSIS — G25.0 ESSENTIAL TREMOR: ICD-10-CM

## 2024-04-12 DIAGNOSIS — G40.909 SEIZURE DISORDER (MULTI): ICD-10-CM

## 2024-04-12 DIAGNOSIS — E66.09 CLASS 1 OBESITY DUE TO EXCESS CALORIES WITH SERIOUS COMORBIDITY AND BODY MASS INDEX (BMI) OF 33.0 TO 33.9 IN ADULT: ICD-10-CM

## 2024-04-12 DIAGNOSIS — D49.0 IPMN (INTRADUCTAL PAPILLARY MUCINOUS NEOPLASM): ICD-10-CM

## 2024-04-12 DIAGNOSIS — D80.1 NONFAMILIAL HYPOGAMMAGLOBULINEMIA (MULTI): ICD-10-CM

## 2024-04-12 DIAGNOSIS — Z00.00 MEDICARE ANNUAL WELLNESS VISIT, SUBSEQUENT: Primary | ICD-10-CM

## 2024-04-12 DIAGNOSIS — C44.311 BASAL CELL CARCINOMA (BCC) OF LEFT SIDE OF NOSE: ICD-10-CM

## 2024-04-12 DIAGNOSIS — C85.91 LYMPHOMA OF LYMPH NODES OF NECK, UNSPECIFIED LYMPHOMA TYPE (MULTI): ICD-10-CM

## 2024-04-12 DIAGNOSIS — R73.02 IGT (IMPAIRED GLUCOSE TOLERANCE): ICD-10-CM

## 2024-04-12 PROCEDURE — 3008F BODY MASS INDEX DOCD: CPT | Performed by: INTERNAL MEDICINE

## 2024-04-12 PROCEDURE — 1036F TOBACCO NON-USER: CPT | Performed by: INTERNAL MEDICINE

## 2024-04-12 PROCEDURE — 1170F FXNL STATUS ASSESSED: CPT | Performed by: INTERNAL MEDICINE

## 2024-04-12 PROCEDURE — 1159F MED LIST DOCD IN RCRD: CPT | Performed by: INTERNAL MEDICINE

## 2024-04-12 PROCEDURE — 99214 OFFICE O/P EST MOD 30 MIN: CPT | Performed by: INTERNAL MEDICINE

## 2024-04-12 PROCEDURE — 1160F RVW MEDS BY RX/DR IN RCRD: CPT | Performed by: INTERNAL MEDICINE

## 2024-04-12 ASSESSMENT — ACTIVITIES OF DAILY LIVING (ADL)
BATHING: INDEPENDENT
GROCERY_SHOPPING: INDEPENDENT
TAKING_MEDICATION: INDEPENDENT
DOING_HOUSEWORK: INDEPENDENT
DRESSING: INDEPENDENT
MANAGING_FINANCES: INDEPENDENT

## 2024-04-12 ASSESSMENT — ANXIETY QUESTIONNAIRES
3. WORRYING TOO MUCH ABOUT DIFFERENT THINGS: NOT AT ALL
IF YOU CHECKED OFF ANY PROBLEMS ON THIS QUESTIONNAIRE, HOW DIFFICULT HAVE THESE PROBLEMS MADE IT FOR YOU TO DO YOUR WORK, TAKE CARE OF THINGS AT HOME, OR GET ALONG WITH OTHER PEOPLE: NOT DIFFICULT AT ALL
7. FEELING AFRAID AS IF SOMETHING AWFUL MIGHT HAPPEN: NOT AT ALL
6. BECOMING EASILY ANNOYED OR IRRITABLE: NOT AT ALL
2. NOT BEING ABLE TO STOP OR CONTROL WORRYING: NOT AT ALL
5. BEING SO RESTLESS THAT IT IS HARD TO SIT STILL: NOT AT ALL
1. FEELING NERVOUS, ANXIOUS, OR ON EDGE: NOT AT ALL
4. TROUBLE RELAXING: NOT AT ALL
GAD7 TOTAL SCORE: 0

## 2024-04-12 ASSESSMENT — ENCOUNTER SYMPTOMS
OCCASIONAL FEELINGS OF UNSTEADINESS: 0
DEPRESSION: 0
LOSS OF SENSATION IN FEET: 0

## 2024-04-12 ASSESSMENT — COLUMBIA-SUICIDE SEVERITY RATING SCALE - C-SSRS
1. IN THE PAST MONTH, HAVE YOU WISHED YOU WERE DEAD OR WISHED YOU COULD GO TO SLEEP AND NOT WAKE UP?: NO
6. HAVE YOU EVER DONE ANYTHING, STARTED TO DO ANYTHING, OR PREPARED TO DO ANYTHING TO END YOUR LIFE?: NO
2. HAVE YOU ACTUALLY HAD ANY THOUGHTS OF KILLING YOURSELF?: NO

## 2024-04-12 ASSESSMENT — PATIENT HEALTH QUESTIONNAIRE - PHQ9
2. FEELING DOWN, DEPRESSED OR HOPELESS: NOT AT ALL
SUM OF ALL RESPONSES TO PHQ9 QUESTIONS 1 AND 2: 0
1. LITTLE INTEREST OR PLEASURE IN DOING THINGS: NOT AT ALL

## 2024-04-12 NOTE — PROGRESS NOTES
"Depression and anxiety screening completed   PHQ9 score   GAD7 score   I spent 15 minutes obtaining and discussing depression screening using PHQ 2 questions with results documented in chart.  Screening using PHQ-9 and CIELO-7 scores were used for follow-up with treatment and referral plan discussed.      I spent greater than 15 minutes face-to-face with individual providing recommendations for nutrition choices and exercise plan to help achieve weight reductionI spent greater than 15 minutes discussing advance care planning including the explanation and discussion of advanced directives.  If patient does not have current up-to-date documents examples and information provided on how to create both living will and power of .  toolkit was given to patient and was encouraged to work out completing these documents.Subjective   Reason for Visit: Reina Knowles is an 73 y.o. female here for a Medicare Wellness visit.     Past Medical, Surgical, and Family History reviewed and updated in chart.    Reviewed all medications by prescribing practitioner or clinical pharmacist (such as prescriptions, OTCs, herbal therapies and supplements) and documented in the medical record.    HPI    Patient Care Team:  Dionte Lovett DO as PCP - General  Dionte Lovett DO as PCP - [employer]age PCP  Maximo Zhao MD as Consulting Physician (Hematology and Oncology)     Review of Systems   All other systems reviewed and are negative.      Objective   Vitals:  /60 (BP Location: Right arm, Patient Position: Sitting)   Pulse 64   Ht 1.702 m (5' 7\")   Wt 96.2 kg (212 lb)   LMP  (LMP Unknown)   BMI 33.20 kg/m²       Physical Exam  Vitals and nursing note reviewed.   Constitutional:       General: She is not in acute distress.     Appearance: Normal appearance. She is well-developed. She is obese. She is not toxic-appearing.   HENT:      Head: Normocephalic and atraumatic.      Right Ear: Tympanic membrane " and external ear normal.      Left Ear: Tympanic membrane and external ear normal.      Nose: Nose normal.      Mouth/Throat:      Mouth: Mucous membranes are moist.      Pharynx: Oropharynx is clear. No oropharyngeal exudate or posterior oropharyngeal erythema.      Tonsils: No tonsillar exudate. 2+ on the right. 2+ on the left.   Eyes:      Extraocular Movements: Extraocular movements intact.      Conjunctiva/sclera: Conjunctivae normal.   Cardiovascular:      Rate and Rhythm: Normal rate and regular rhythm.      Pulses: Normal pulses.      Heart sounds: Normal heart sounds. No murmur heard.  Pulmonary:      Effort: Pulmonary effort is normal.      Breath sounds: Normal breath sounds.   Abdominal:      General: Abdomen is flat. Bowel sounds are normal.      Palpations: Abdomen is soft.   Musculoskeletal:      Cervical back: Neck supple.   Lymphadenopathy:      Cervical: No cervical adenopathy.   Skin:     General: Skin is warm and dry.      Findings: No rash.   Neurological:      Mental Status: She is alert. Mental status is at baseline.   Psychiatric:         Mood and Affect: Mood normal.         Behavior: Behavior normal.         Thought Content: Thought content normal.         Judgment: Judgment normal.         Assessment/Plan   Problem List Items Addressed This Visit       Basal cell carcinoma (BCC) of left side of nose    Current Assessment & Plan     Referral to dermatology for skin exam and active surveillance         Relevant Orders    Referral to Dermatology    Chronic lymphocytic leukemia in remission (Multi)    Overview     Cancer History:   Treatment Synopsis:    Chronic lymphocytic leukemia diagnosed December 2010.   12/16/2010: Bone marrow showed chronic lymphocytic leukemia/small lymphocytic lymphoma with CD19,CD20, CD5, and CD23 positive and lambda immunoglobulin light chains.   CD38 was moderate. 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  2015: Developed significant adenopathy, B symptoms.  July-December 2015:  Fludarabine/rituximab x 6 cycles.  August-2019: Bendamustine/rituximab x 4 cycles.  Remission.  10/19/2021: WBC 5.2.  ALC 0.3.  Hemoglobin 12.2.  Platelets 138,000.   4/15/2022: WBC 4.7.  A.7.  Hemoglobin 12.1.  Platelets 156,000.  In remission.  10/15/2022: WBC 4.2  A.13 Hemoglobin 11.8.  Platelets 161,000.   2023: WBC 4.5.  ALC 0.9.  Hemoglobin 11.0.  Platelets 167,000.  CMP, iron studies: Normal.   10/12/23 , WBC count 3.8, hemoglobin 13.3, platelets 200         Current Assessment & Plan     1.  Chronic lymphocytic leukemia, IGVH mutated, diagnosed , s/p chemotherapy with FR  and BR .  In remission.          Relevant Orders    Hemoglobin A1C (Completed)    Lipid Panel (Completed)    MRCP pancreas wo IV contrast    Referral to Dermatology    Follow Up In [facility] - PCP - Established    Essential tremor    Current Assessment & Plan     Stable on current dose of propranolol  mg daily         IPMN (intraductal papillary mucinous neoplasm)    Current Assessment & Plan     Scheduled for MRCP to document stability asymptomatic at this time denies epigastric pain changes in bowel habits changes in appetite monitor closely in the setting of CLL and lymphoma         Relevant Orders    MRCP pancreas wo IV contrast    Seizure disorder (Multi)    Current Assessment & Plan     Stable no seizures noted on Depakote 500 mg daily continue         Squamous cell carcinoma of right upper extremity    Current Assessment & Plan     Stable referral to dermatology for surveillance         Relevant Orders    Referral to Dermatology    Tubular adenoma of colon    Current Assessment & Plan     Colonoscopy completed in  [place] for 5-year surveillance          Medicare annual wellness visit, subsequent - Primary    Current Assessment & Plan     Discussed advanced medical directives and placed on chart screening for depression and anxiety was also completed stable at this time  working on treatment of BMI of 33 with recent 5 pound weight gain since last examination.  Up-to-date with vaccinations and screenings         Class 1 obesity due to excess calories with serious comorbidity and body mass index (BMI) of 33.0 to 33.9 in adult    Current Assessment & Plan     Calculated ASCVD risk score of 14% patient interested in trying GLP-1 agonist therapy with 's lower dose of Trulicity work on continued lifestyle changes and diet and reevaluate lab work for impaired fasting sugars and indications         Nonfamilial hypogammaglobulinemia (Multi)    Current Assessment & Plan     Monitored with hematology oncology no record of any recent immunoglobin infusions stable from a infectious disease standpoint with CLL in remission         Lymphoma of lymph nodes of neck, unspecified lymphoma type (Multi)    Current Assessment & Plan     n remission no evidence of reoccurrence status posttreatment with Bendamustine and rituximab in the past continue surveillance         IGT (impaired glucose tolerance)    Current Assessment & Plan     Fasting blood sugar stable mild impaired fasting A1c of 6.2 continue to monitor closely mild dyslipidemia with LDL cholesterol of 126 with calculated ASCVD risk score of 14% reevaluate in 6 months and discuss medical therapies such as initiation of statin and improvement of impaired glucose tolerance in the setting of BMI 33         Relevant Orders    Hemoglobin A1C (Completed)    Comprehensive Metabolic Panel    Hemoglobin A1C    Lipid Panel    Albumin , Urine Random     Other Visit Diagnoses       Dyslipidemia        Relevant Orders    Lipid Panel (Completed)    Comprehensive Metabolic Panel    Hemoglobin A1C    Lipid Panel    Albumin , Urine Random    Routine general medical examination at health care facility

## 2024-04-12 NOTE — PROGRESS NOTES
"Subjective   Patient ID: Reina Knowles is a 73 y.o. female who presents for Medicare Annual Wellness Visit Initial.    HPI     Review of Systems    Objective   /60 (BP Location: Right arm, Patient Position: Sitting)   Pulse 64   Ht 1.702 m (5' 7\")   Wt 96.2 kg (212 lb)   LMP  (LMP Unknown)   BMI 33.20 kg/m²     Physical Exam    Assessment/Plan          "

## 2024-04-15 ENCOUNTER — OFFICE VISIT (OUTPATIENT)
Dept: HEMATOLOGY/ONCOLOGY | Facility: CLINIC | Age: 74
End: 2024-04-15
Payer: MEDICARE

## 2024-04-15 VITALS
OXYGEN SATURATION: 95 % | TEMPERATURE: 97.3 F | BODY MASS INDEX: 33.29 KG/M2 | WEIGHT: 212.52 LBS | DIASTOLIC BLOOD PRESSURE: 78 MMHG | SYSTOLIC BLOOD PRESSURE: 132 MMHG | HEART RATE: 56 BPM | RESPIRATION RATE: 16 BRPM

## 2024-04-15 DIAGNOSIS — C91.11 CHRONIC LYMPHOCYTIC LEUKEMIA IN REMISSION (MULTI): ICD-10-CM

## 2024-04-15 DIAGNOSIS — E78.5 DYSLIPIDEMIA: ICD-10-CM

## 2024-04-15 DIAGNOSIS — R73.02 IGT (IMPAIRED GLUCOSE TOLERANCE): ICD-10-CM

## 2024-04-15 PROBLEM — U07.1 CLINICAL DIAGNOSIS OF COVID-19: Status: RESOLVED | Noted: 2023-04-12 | Resolved: 2024-04-15

## 2024-04-15 LAB
ALBUMIN SERPL BCP-MCNC: 3.7 G/DL (ref 3.4–5)
ALP SERPL-CCNC: 53 U/L (ref 33–136)
ALT SERPL W P-5'-P-CCNC: 10 U/L (ref 7–45)
ANION GAP SERPL CALC-SCNC: 11 MMOL/L (ref 10–20)
AST SERPL W P-5'-P-CCNC: 17 U/L (ref 9–39)
BASOPHILS # BLD AUTO: 0.02 X10*3/UL (ref 0–0.1)
BASOPHILS NFR BLD AUTO: 0.5 %
BILIRUB SERPL-MCNC: 1 MG/DL (ref 0–1.2)
BUN SERPL-MCNC: 18 MG/DL (ref 6–23)
CALCIUM SERPL-MCNC: 8.9 MG/DL (ref 8.6–10.3)
CHLORIDE SERPL-SCNC: 106 MMOL/L (ref 98–107)
CHOLEST SERPL-MCNC: 208 MG/DL (ref 0–199)
CHOLESTEROL/HDL RATIO: 3
CO2 SERPL-SCNC: 28 MMOL/L (ref 21–32)
CREAT SERPL-MCNC: 0.82 MG/DL (ref 0.5–1.05)
EGFRCR SERPLBLD CKD-EPI 2021: 76 ML/MIN/1.73M*2
EOSINOPHIL # BLD AUTO: 0.06 X10*3/UL (ref 0–0.4)
EOSINOPHIL NFR BLD AUTO: 1.6 %
ERYTHROCYTE [DISTWIDTH] IN BLOOD BY AUTOMATED COUNT: 14.1 % (ref 11.5–14.5)
EST. AVERAGE GLUCOSE BLD GHB EST-MCNC: 131 MG/DL
GLUCOSE SERPL-MCNC: 93 MG/DL (ref 74–99)
HBA1C MFR BLD: 6.2 %
HCT VFR BLD AUTO: 36.3 % (ref 36–46)
HDLC SERPL-MCNC: 70.3 MG/DL
HGB BLD-MCNC: 11.8 G/DL (ref 12–16)
IMM GRANULOCYTES # BLD AUTO: 0.01 X10*3/UL (ref 0–0.5)
IMM GRANULOCYTES NFR BLD AUTO: 0.3 % (ref 0–0.9)
LDLC SERPL CALC-MCNC: 126 MG/DL
LYMPHOCYTES # BLD AUTO: 1.19 X10*3/UL (ref 0.8–3)
LYMPHOCYTES NFR BLD AUTO: 32.1 %
MCH RBC QN AUTO: 28.3 PG (ref 26–34)
MCHC RBC AUTO-ENTMCNC: 32.5 G/DL (ref 32–36)
MCV RBC AUTO: 87 FL (ref 80–100)
MONOCYTES # BLD AUTO: 0.4 X10*3/UL (ref 0.05–0.8)
MONOCYTES NFR BLD AUTO: 10.8 %
NEUTROPHILS # BLD AUTO: 2.03 X10*3/UL (ref 1.6–5.5)
NEUTROPHILS NFR BLD AUTO: 54.7 %
NON HDL CHOLESTEROL: 138 MG/DL (ref 0–149)
PLATELET # BLD AUTO: 196 X10*3/UL (ref 150–450)
POTASSIUM SERPL-SCNC: 4 MMOL/L (ref 3.5–5.3)
PROT SERPL-MCNC: 6.3 G/DL (ref 6.4–8.2)
RBC # BLD AUTO: 4.17 X10*6/UL (ref 4–5.2)
SODIUM SERPL-SCNC: 141 MMOL/L (ref 136–145)
TRIGL SERPL-MCNC: 58 MG/DL (ref 0–149)
VLDL: 12 MG/DL (ref 0–40)
WBC # BLD AUTO: 3.7 X10*3/UL (ref 4.4–11.3)

## 2024-04-15 PROCEDURE — 80053 COMPREHEN METABOLIC PANEL: CPT | Performed by: INTERNAL MEDICINE

## 2024-04-15 PROCEDURE — 1159F MED LIST DOCD IN RCRD: CPT | Performed by: INTERNAL MEDICINE

## 2024-04-15 PROCEDURE — 36415 COLL VENOUS BLD VENIPUNCTURE: CPT | Performed by: INTERNAL MEDICINE

## 2024-04-15 PROCEDURE — 85025 COMPLETE CBC W/AUTO DIFF WBC: CPT | Performed by: INTERNAL MEDICINE

## 2024-04-15 PROCEDURE — 83036 HEMOGLOBIN GLYCOSYLATED A1C: CPT | Performed by: INTERNAL MEDICINE

## 2024-04-15 PROCEDURE — 80061 LIPID PANEL: CPT | Performed by: INTERNAL MEDICINE

## 2024-04-15 PROCEDURE — 99213 OFFICE O/P EST LOW 20 MIN: CPT | Performed by: INTERNAL MEDICINE

## 2024-04-15 PROCEDURE — 1126F AMNT PAIN NOTED NONE PRSNT: CPT | Performed by: INTERNAL MEDICINE

## 2024-04-15 PROCEDURE — 1160F RVW MEDS BY RX/DR IN RCRD: CPT | Performed by: INTERNAL MEDICINE

## 2024-04-15 PROCEDURE — 3008F BODY MASS INDEX DOCD: CPT | Performed by: INTERNAL MEDICINE

## 2024-04-15 ASSESSMENT — PAIN SCALES - GENERAL: PAINLEVEL: 0-NO PAIN

## 2024-04-15 NOTE — PROGRESS NOTES
Patient Visit Information:   Visit Type: Follow Up Visit      Cancer History:   Treatment Synopsis:    Chronic lymphocytic leukemia diagnosed 2010.   2010: Bone marrow showed chronic lymphocytic leukemia/small lymphocytic lymphoma with CD19,CD20, CD5, and CD23 positive and lambda immunoglobulin light chains.   CD38 was moderate. 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  2015: Developed significant adenopathy, B symptoms.  July-2015: Fludarabine/rituximab x 6 cycles.  August-2019: Bendamustine/rituximab x 4 cycles.  Remission.  10/19/2021: WBC 5.2.  ALC 0.3.  Hemoglobin 12.2.  Platelets 138,000.   4/15/2022: WBC 4.7.  A.7.  Hemoglobin 12.1.  Platelets 156,000.  In remission.  10/15/2022: WBC 4.2  A.13 Hemoglobin 11.8.  Platelets 161,000.   2023: WBC 4.5.  ALC 0.9.  Hemoglobin 11.0.  Platelets 167,000.  CMP, iron studies: Normal.   10/12/23 , WBC count 3.8, hemoglobin 13.3, platelets 200   4/15/24 ,   WBC count 3.7, hemoglobin 11.8, platelets 196, 54% neutrophil, 32% lymphocyte  History of Present Illness:      ID Statement:    REINA KNOWLES is a 72 year old Female        Chief Complaint: Office visit for treatment of chronic  lymphocytic leukemia.   Interval History:    4/15/24  Reina Knowles is a 73-year-old female, with history of CLL, who presents for continuation of care.  She is doing very well, no B symptoms.  Lower GI symptoms, pancreatic lesions have been stable which are documented by previous MRI done in 2023    Past medical history: CLL diagnosed , begin treatment with FIR  and received 4 cycles BR , as described above.  In remission.  History of hypogammaglobulinemia, receives IVIG infusions.  Positive Cologuard test, pancreatic lesion (monitored  by Dr. Morgan), neurologic condition, cataract surgery.  COVID-19 infection, long-haul, 2020-2021. Colonoscopy 22- two polyps (benign)- recommended follow-up in 3-5 years.      Review of  Systems:   Review of Systems:    Constitutional: Feeling well, no fatigue or weakness.  Head and neck: No headaches or dizziness.     HEENT: No sore throat or sinusitis.  Hearing is normal eyesight is good.  Cardiac: No chest pain or palpitations.  Pulmonary: no cough or shortness of breath.  GI: Appetite is good and weight stable.  No constipation or diarrhea.  No abdominal pain  Genitourinary: No frequency or urgency.  No polyuria or dysuria.  Musculocutaneous: No arthritis.  Endocrine: No diabetes no thyroid disease.  Skin: No rash or itching.  Neuromuscular : no fainting or dizziness.  No history of convulsions.  No tingling or numbness.           Allergies and Intolerances:       Allergies:         azithromycin: Drug, Unknown, Active     Outpatient Medication Profile:  * Patient Currently Takes Medications as of 14-Apr-2023 12:49 documented in Structured Notes         Depakote  mg oral tablet, extended release : Last Dose Taken:  , 1 tab(s) orally once a day         propranolol 40 mg oral tablet: Last Dose Taken:  , 1 tab(s) orally 2  times a day             Medical History:         Seizure: ICD-10: R56.9, Status: Active         COVID-19: ICD-10: U07.1, Status: Active         Hypogammaglobulinemia: ICD-10: D80.1, Status: Active         Chronic cough: ICD-10: R05, Status: Active         Chronic sinusitis: ICD-10: J32.9, Status: Active         Neutropenia: ICD-10: D70.9, Status: Active         Chronic lymphocytic leukemia: ICD-10: C91.10, Status: Active       Surg History:         History of lymph node biopsy: ICD-10: Z98.89, Status: Active     Family History: Family history of neoplasm of brain  (Maternal Grandmother Age Unknown)      Social History:   Social Substance History:  ·  Smoking Status never smoker   ·  Additional History     Social history: She works at a bank.(1)           Vitals and Measurements:   Vitals: Temp: 36.2  HR: 58  RR: 16  BP: 118/64  SPO2%:   98   Measurements: HT(cm): 167.9   WT(kg): 98.4  BSA: 2.14   BMI:  34.9      Physical Exam:      Constitutional: Well developed, awake/alert/oriented  x3.   Eyes: PERRL, EOMI, clear sclera.   ENMT: No external lesions noted.   Head/Neck: Neck with normal movement.  No mass, adenopathy  or tenderness.  No JVD. Trachea midline.   Respiratory/Thorax: Thorax symmetric. Clear to auscultation.   No wheezes, rales, rhonchi.   Cardiovascular: Regular, rate and rhythm. No murmur.   No rubs or gallops.   Gastrointestinal: Nondistended.  Normal bowel sounds.   Soft, non-tender. No rebound or guarding. No masses palpable.  No palpable hepatomegaly, splenomegaly.   Musculoskeletal: ROM intact.  No significant joint  swelling. Adequate strength. No significant deformity.   Extremities: Mild distal lower extremity edema, chronic,  stable per patient.   Neurological: Alert and oriented x3. Senses and cranial  nerves grossly intact. No focal motor deficits noted. No focal sensory deficits.   Lymphatic: No palpably significant lymphadenopathy  in the neck, supraclavicular, axillary areas or other areas.   Psychological: Appropriate mood and behavior.   Skin: Warm and dry, no rashes, no suspicious lesions.         Lab Results:        Ref Range & Units 09:12  (4/15/24) 6 mo ago  (10/12/23) 1 yr ago  (4/14/23) 1 yr ago  (10/14/22) 2 yr ago  (4/15/22) 2 yr ago  (10/19/21) 2 yr ago  (7/19/21)   WBC  4.4 - 11.3 x10*3/uL 3.7 Low  3.8 Low  4.5 R 4.2 Low  R 4.7 R 5.2 R 2.9 Low  R   RBC  4.00 - 5.20 x10*6/uL 4.17 4.85 4.01 R 4.24 R 4.45 R 4.30 R 3.83 Low  R   Hemoglobin  12.0 - 16.0 g/dL 11.8 Low  13.3 11.0 Low  11.8 Low  12.1 12.2 11.4 Low    Hematocrit  36.0 - 46.0 % 36.3 41.2 34.3 Low  36.7 38.2 37.5 36.7   MCV  80 - 100 fL 87 85 86 87 86 87 96   MCH  26.0 - 34.0 pg 28.3 27.4        MCHC  32.0 - 36.0 g/dL 32.5 32.3 32.1 32.2 31.7 Low  32.5 31.1 Low    RDW  11.5 - 14.5 % 14.1 14.5 14.1 15.5 High  14.0 13.0 13.2   Platelets  150 - 450 x10*3/uL 196 200 167 R 161 R 156 R 138 Low  " R 131 Low  R   Neutrophils %  40.0 - 80.0 % 54.7 60.0 66.5 56.5 69.4 84.3    Immature Granulocytes %, Automated  0.0 - 0.9 % 0.3 0.3 CM 0.2 CM 0.5 CM 0.0 CM 0.2 CM    Comment: Immature Granulocyte Count (IG) includes promyelocytes, myelocytes and metamyelocytes but does not include bands. Percent differential counts (%) should be interpreted in the context of the absolute cell counts (cells/UL).   Lymphocytes %  13.0 - 44.0 % 32.1 20.0 20.7 27.1 15.6 6.0          Assessment and Plan:   Assessment:        1.  Chronic lymphocytic leukemia, IGVH mutated, diagnosed 2010, s/p chemotherapy with FR 2015 and BR 2019/20.  In remission.      2.  Abnormal Cologuard test. Colonoscopy 4/20/2022- 2 polyps (benign)- recommended follow-up 3-5 years.     3.  History of pancreatic lesion, reportedly stable.      4.  Multiple medical problems.       5.  Slowly progressing anemia, rule out due to chronic disease, occult blood loss or bone marrow insufficiency.     Plan: In terms of chronic lymphocytic leukemia no evidence of progression of disease, lab result discussed with patient follow-up after 6 months.     Total time =20 minutes. 50% or more of this time was spent in counseling and/or coordination of care including reviewing medical history/radiology/labs, examining patient, formulating outlined plan  with team, and discussing plan with patient/family.  I reviewed patient's chart including but not limited to labs, imaging, surgical/procedure notes, pathology, hospital notes, doctor's notes.                                            Patient Instructions:      Instructions Type: nutrition            Note Recipients: Ej Morgan MD Yanelli, Emmanuel,    Select \"Yes\" when ready to send to Provider(s) Listed Above:  Note sent to providers named above     "

## 2024-04-16 NOTE — ASSESSMENT & PLAN NOTE
Fasting blood sugar stable mild impaired fasting A1c of 6.2 continue to monitor closely mild dyslipidemia with LDL cholesterol of 126 with calculated ASCVD risk score of 14% reevaluate in 6 months and discuss medical therapies such as initiation of statin and improvement of impaired glucose tolerance in the setting of BMI 33

## 2024-04-16 NOTE — ASSESSMENT & PLAN NOTE
Scheduled for MRCP to document stability asymptomatic at this time denies epigastric pain changes in bowel habits changes in appetite monitor closely in the setting of CLL and lymphoma

## 2024-04-16 NOTE — ASSESSMENT & PLAN NOTE
1.  Chronic lymphocytic leukemia, IGVH mutated, diagnosed 2010, s/p chemotherapy with FR 2015 and BR 2019/20.  In remission.

## 2024-04-16 NOTE — ASSESSMENT & PLAN NOTE
Discussed advanced medical directives and placed on chart screening for depression and anxiety was also completed stable at this time working on treatment of BMI of 33 with recent 5 pound weight gain since last examination.  Up-to-date with vaccinations and screenings

## 2024-04-16 NOTE — ASSESSMENT & PLAN NOTE
Monitored with hematology oncology no record of any recent immunoglobin infusions stable from a infectious disease standpoint with CLL in remission

## 2024-04-16 NOTE — ASSESSMENT & PLAN NOTE
n remission no evidence of reoccurrence status posttreatment with Bendamustine and rituximab in the past continue surveillance

## 2024-04-16 NOTE — ASSESSMENT & PLAN NOTE
Calculated ASCVD risk score of 14% patient interested in trying GLP-1 agonist therapy with 's lower dose of Trulicity work on continued lifestyle changes and diet and reevaluate lab work for impaired fasting sugars and indications

## 2024-10-10 DIAGNOSIS — C91.11 CHRONIC LYMPHOCYTIC LEUKEMIA IN REMISSION (MULTI): Primary | ICD-10-CM

## 2024-10-11 ENCOUNTER — APPOINTMENT (OUTPATIENT)
Dept: PRIMARY CARE | Facility: CLINIC | Age: 74
End: 2024-10-11
Payer: MEDICARE

## 2024-10-11 ENCOUNTER — LAB (OUTPATIENT)
Dept: LAB | Facility: LAB | Age: 74
End: 2024-10-11
Payer: MEDICARE

## 2024-10-11 VITALS
DIASTOLIC BLOOD PRESSURE: 80 MMHG | OXYGEN SATURATION: 98 % | WEIGHT: 218 LBS | SYSTOLIC BLOOD PRESSURE: 136 MMHG | HEIGHT: 67 IN | BODY MASS INDEX: 34.21 KG/M2 | HEART RATE: 72 BPM

## 2024-10-11 DIAGNOSIS — I10 PRIMARY HYPERTENSION: Primary | ICD-10-CM

## 2024-10-11 DIAGNOSIS — D80.1 NONFAMILIAL HYPOGAMMAGLOBULINEMIA (MULTI): ICD-10-CM

## 2024-10-11 DIAGNOSIS — C85.91 LYMPHOMA OF LYMPH NODES OF NECK, UNSPECIFIED LYMPHOMA TYPE (MULTI): ICD-10-CM

## 2024-10-11 DIAGNOSIS — R73.02 IGT (IMPAIRED GLUCOSE TOLERANCE): ICD-10-CM

## 2024-10-11 DIAGNOSIS — E66.09 CLASS 1 OBESITY DUE TO EXCESS CALORIES WITH SERIOUS COMORBIDITY AND BODY MASS INDEX (BMI) OF 34.0 TO 34.9 IN ADULT: ICD-10-CM

## 2024-10-11 DIAGNOSIS — E66.811 CLASS 1 OBESITY DUE TO EXCESS CALORIES WITH SERIOUS COMORBIDITY AND BODY MASS INDEX (BMI) OF 34.0 TO 34.9 IN ADULT: ICD-10-CM

## 2024-10-11 DIAGNOSIS — C91.11 CHRONIC LYMPHOCYTIC LEUKEMIA IN REMISSION (MULTI): ICD-10-CM

## 2024-10-11 DIAGNOSIS — G25.0 DISABLING ESSENTIAL TREMOR: ICD-10-CM

## 2024-10-11 PROBLEM — G40.909 SEIZURE DISORDER (MULTI): Status: RESOLVED | Noted: 2023-04-12 | Resolved: 2024-10-11

## 2024-10-11 LAB
ALBUMIN SERPL BCP-MCNC: 3.7 G/DL (ref 3.4–5)
ALP SERPL-CCNC: 67 U/L (ref 33–136)
ALT SERPL W P-5'-P-CCNC: 16 U/L (ref 7–45)
ANION GAP SERPL CALC-SCNC: 12 MMOL/L (ref 10–20)
AST SERPL W P-5'-P-CCNC: 23 U/L (ref 9–39)
BILIRUB SERPL-MCNC: 0.8 MG/DL (ref 0–1.2)
BUN SERPL-MCNC: 16 MG/DL (ref 6–23)
CALCIUM SERPL-MCNC: 8.6 MG/DL (ref 8.6–10.3)
CHLORIDE SERPL-SCNC: 106 MMOL/L (ref 98–107)
CHOLEST SERPL-MCNC: 203 MG/DL (ref 0–199)
CHOLESTEROL/HDL RATIO: 3.1
CO2 SERPL-SCNC: 27 MMOL/L (ref 21–32)
CREAT SERPL-MCNC: 0.78 MG/DL (ref 0.5–1.05)
CREAT UR-MCNC: 192.7 MG/DL (ref 20–320)
EGFRCR SERPLBLD CKD-EPI 2021: 80 ML/MIN/1.73M*2
EST. AVERAGE GLUCOSE BLD GHB EST-MCNC: 126 MG/DL
GLUCOSE SERPL-MCNC: 95 MG/DL (ref 74–99)
HBA1C MFR BLD: 6 %
HDLC SERPL-MCNC: 64.8 MG/DL
LDLC SERPL CALC-MCNC: 125 MG/DL
MICROALBUMIN UR-MCNC: 15.3 MG/L
MICROALBUMIN/CREAT UR: 7.9 UG/MG CREAT
NON HDL CHOLESTEROL: 138 MG/DL (ref 0–149)
POTASSIUM SERPL-SCNC: 3.6 MMOL/L (ref 3.5–5.3)
PROT SERPL-MCNC: 6.4 G/DL (ref 6.4–8.2)
SODIUM SERPL-SCNC: 141 MMOL/L (ref 136–145)
TRIGL SERPL-MCNC: 67 MG/DL (ref 0–149)
VLDL: 13 MG/DL (ref 0–40)

## 2024-10-11 PROCEDURE — 36415 COLL VENOUS BLD VENIPUNCTURE: CPT

## 2024-10-11 PROCEDURE — 3079F DIAST BP 80-89 MM HG: CPT | Performed by: INTERNAL MEDICINE

## 2024-10-11 PROCEDURE — 3008F BODY MASS INDEX DOCD: CPT | Performed by: INTERNAL MEDICINE

## 2024-10-11 PROCEDURE — 1159F MED LIST DOCD IN RCRD: CPT | Performed by: INTERNAL MEDICINE

## 2024-10-11 PROCEDURE — 1160F RVW MEDS BY RX/DR IN RCRD: CPT | Performed by: INTERNAL MEDICINE

## 2024-10-11 PROCEDURE — 3075F SYST BP GE 130 - 139MM HG: CPT | Performed by: INTERNAL MEDICINE

## 2024-10-11 PROCEDURE — 99214 OFFICE O/P EST MOD 30 MIN: CPT | Performed by: INTERNAL MEDICINE

## 2024-10-11 PROCEDURE — 1036F TOBACCO NON-USER: CPT | Performed by: INTERNAL MEDICINE

## 2024-10-11 RX ORDER — PRIMIDONE 50 MG/1
50 TABLET ORAL 4 TIMES DAILY
Qty: 120 TABLET | Refills: 0 | Status: SHIPPED | OUTPATIENT
Start: 2024-10-11 | End: 2025-10-11

## 2024-10-11 RX ORDER — TRIAMTERENE/HYDROCHLOROTHIAZID 37.5-25 MG
1 TABLET ORAL DAILY
Qty: 90 TABLET | Refills: 3 | Status: SHIPPED | OUTPATIENT
Start: 2024-10-11 | End: 2025-10-11

## 2024-10-11 ASSESSMENT — PATIENT HEALTH QUESTIONNAIRE - PHQ9
1. LITTLE INTEREST OR PLEASURE IN DOING THINGS: NOT AT ALL
2. FEELING DOWN, DEPRESSED OR HOPELESS: NOT AT ALL
SUM OF ALL RESPONSES TO PHQ9 QUESTIONS 1 AND 2: 0

## 2024-10-11 ASSESSMENT — ANXIETY QUESTIONNAIRES
3. WORRYING TOO MUCH ABOUT DIFFERENT THINGS: NOT AT ALL
6. BECOMING EASILY ANNOYED OR IRRITABLE: NOT AT ALL
2. NOT BEING ABLE TO STOP OR CONTROL WORRYING: NOT AT ALL
5. BEING SO RESTLESS THAT IT IS HARD TO SIT STILL: NOT AT ALL
4. TROUBLE RELAXING: NOT AT ALL
1. FEELING NERVOUS, ANXIOUS, OR ON EDGE: NOT AT ALL
GAD7 TOTAL SCORE: 0
7. FEELING AFRAID AS IF SOMETHING AWFUL MIGHT HAPPEN: NOT AT ALL

## 2024-10-11 ASSESSMENT — ENCOUNTER SYMPTOMS
DEPRESSION: 0
LOSS OF SENSATION IN FEET: 0
OCCASIONAL FEELINGS OF UNSTEADINESS: 0

## 2024-10-11 NOTE — PROGRESS NOTES
"Subjective   Reason for Visit: Reina Knowles is an 73 y.o. female here for a fu  visit.     Past Medical, Surgical, and Family History reviewed and updated in chart.         HPI    Patient Care Team:  Dionte Lovett DO as PCP - General  Dionte Lovett DO as PCP - [employer] PCP  Maximo Zhao MD as Consulting Physician (Hematology and Oncology)     Review of Systems   All other systems reviewed and are negative.      Objective   Vitals:  /80   Pulse 72   Ht 1.702 m (5' 7\")   Wt 98.9 kg (218 lb)   LMP  (LMP Unknown)   SpO2 98%   BMI 34.14 kg/m²       Physical Exam  Vitals and nursing note reviewed.   Constitutional:       General: She is not in acute distress.     Appearance: Normal appearance. She is well-developed. She is not toxic-appearing.   HENT:      Head: Normocephalic and atraumatic.      Right Ear: Tympanic membrane and external ear normal.      Left Ear: Tympanic membrane and external ear normal.      Nose: Nose normal.      Mouth/Throat:      Mouth: Mucous membranes are moist.      Pharynx: Oropharynx is clear. No oropharyngeal exudate or posterior oropharyngeal erythema.      Tonsils: No tonsillar exudate. 2+ on the right. 2+ on the left.   Eyes:      Extraocular Movements: Extraocular movements intact.      Conjunctiva/sclera: Conjunctivae normal.   Cardiovascular:      Rate and Rhythm: Normal rate and regular rhythm.      Pulses: Normal pulses.      Heart sounds: Normal heart sounds. No murmur heard.  Pulmonary:      Effort: Pulmonary effort is normal.      Breath sounds: Normal breath sounds.   Abdominal:      General: Abdomen is flat. Bowel sounds are normal.      Palpations: Abdomen is soft.   Musculoskeletal:      Cervical back: Neck supple.   Lymphadenopathy:      Cervical: No cervical adenopathy.   Skin:     General: Skin is warm and dry.      Findings: No rash.   Neurological:      Mental Status: She is alert. Mental status is at baseline.   Psychiatric:    "      Mood and Affect: Mood normal.         Behavior: Behavior normal.         Thought Content: Thought content normal.         Judgment: Judgment normal.     Lifestyle Recommendations  I recommend a whole-food plant-based diet, an eating pattern that encourages the consumption of unrefined plant foods (such as fruits, vegetables, tubers, whole grains, legumes, nuts and seeds) and discourages meats, dairy products, eggs and processed foods.     The AHA/ACC recommends that the patient consume a dietary pattern that emphasizes intake of vegetables, fruits, and whole grains; includes low-fat dairy products, poultry, fish, legumes, non-tropical vegetable oils, and nuts; and limits intake of sodium, sweets, sugar-sweetened beverages, and red meats.  Adapt this dietary pattern to appropriate calorie requirements (a 500-750 kcal/day deficit to loose weight), personal and cultural food preferences, and nutrition therapy for other medical conditions (including diabetes).  Achieve this pattern by following plans such as the [place] Mediterranean, DASH dietary pattern, or AHA diet.     Engage in 2 hours and 30 minutes per week of moderate-intensity physical activity, or 1 hour and 15 minutes (75 minutes) per week of vigorous-intensity aerobic physical activity, or an equivalent combination of moderate and vigorous-intensity aerobic physical activity. Aerobic activity should be performed in episodes of at least 10 minutes preferably spread throughout the week.     Adhering to a heart healthy diet, regular exercise habits, avoidance of tobacco products, and maintenance of a healthy weight are crucial components of their heart disease risk reduction.    Assessment & Plan  Chronic lymphocytic leukemia in remission (Multi)  Continues to follow with oncology with monitoring of CBC differential currently asymptomatic  Orders:    Follow Up In [facility] - PCP - Established    triamterene-hydrochlorothiazid (Maxzide-25) 37.5-25  mg tablet; Take 1 tablet by mouth once daily.    Comprehensive metabolic panel; Future    Follow Up In [facility] - PCP - Medicare Annual; Future    IGT (impaired glucose tolerance)  Fasting blood sugar okay at 95 hemoglobin A1c of 6.0 improved from 6.2 continue to work at diet and regular exercise to improve overall IGT  Orders:    triamterene-hydrochlorothiazid (Maxzide-25) 37.5-25 mg tablet; Take 1 tablet by mouth once daily.    Comprehensive metabolic panel; Future    Follow Up In [facility] - PCP - Medicare Annual; Future    Class 1 obesity due to excess calories with serious comorbidity and body mass index (BMI) of 34.0 to 34.9 in adult  6 pound weight gain since last examination struggling with consistency to lose weight despite multiple efforts encouraged strength training and regular aerobic activity as well as modified diet does not qualify for GLP-1 agonist medication for treatment of elevated BMI continue to monitor closely       Disabling essential tremor  Taper off propranolol due to little response with tremor and start titration of primidone starting at 50 mg nightly and titrating by 50 mg a week to 200 mg nightly reevaluate in 3 months  Orders:    primidone (Mysoline) 50 mg tablet; Take 1 tablet (50 mg) by mouth 4 times a day.    triamterene-hydrochlorothiazid (Maxzide-25) 37.5-25 mg tablet; Take 1 tablet by mouth once daily.    Comprehensive metabolic panel; Future    Follow Up In [facility] - PCP - Medicare Annual; Future    Primary hypertension  While tapering off propranolol with mildly elevated blood pressure we will start triamterene hydrochlorothiazide 1 tablet daily 37 5/25 patient with dependent edema secondary to chronic venous insufficiency and chronic lymphedema of lower extremities stable at this time.  Use of furosemide on as-needed basis reevaluate with lab work  Orders:    triamterene-hydrochlorothiazid (Maxzide-25) 37.5-25 mg tablet; Take 1 tablet by  mouth once daily.    Comprehensive metabolic panel; Future    Follow Up In [facility] - PCP - Medicare Annual; Future    Lymphoma of lymph nodes of neck, unspecified lymphoma type (Multi)  No evidence of local lymphoma.  Continue to monitor with oncology         Nonfamilial hypogammaglobulinemia (Multi)  Continue to monitor immunoglobin levels with hematology oncology stable at this time no evidence of recurrent infections

## 2024-10-11 NOTE — ASSESSMENT & PLAN NOTE
6 pound weight gain since last examination struggling with consistency to lose weight despite multiple efforts encouraged strength training and regular aerobic activity as well as modified diet does not qualify for GLP-1 agonist medication for treatment of elevated BMI continue to monitor closely

## 2024-10-11 NOTE — ASSESSMENT & PLAN NOTE
Fasting blood sugar okay at 95 hemoglobin A1c of 6.0 improved from 6.2 continue to work at diet and regular exercise to improve overall IGT  Orders:    triamterene-hydrochlorothiazid (Maxzide-25) 37.5-25 mg tablet; Take 1 tablet by mouth once daily.    Comprehensive metabolic panel; Future    Follow Up In [facility] - PCP - Medicare Annual; Future

## 2024-10-11 NOTE — PROGRESS NOTES
"Subjective   Patient ID: Reina Knowles is a 73 y.o. female who presents for Follow-up.    HPI     Review of Systems    Objective   /80   Pulse 72   Ht 1.702 m (5' 7\")   Wt 98.9 kg (218 lb)   LMP  (LMP Unknown)   SpO2 98%   BMI 34.14 kg/m²     Physical Exam    Assessment/Plan          "

## 2024-10-11 NOTE — ASSESSMENT & PLAN NOTE
Continues to follow with oncology with monitoring of CBC differential currently asymptomatic  Orders:    Follow Up In [facility] - PCP - Established    triamterene-hydrochlorothiazid (Maxzide-25) 37.5-25 mg tablet; Take 1 tablet by mouth once daily.    Comprehensive metabolic panel; Future    Follow Up In [facility] - PCP - Medicare Annual; Future

## 2024-10-13 PROBLEM — I10 PRIMARY HYPERTENSION: Status: ACTIVE | Noted: 2024-10-13

## 2024-10-13 NOTE — ASSESSMENT & PLAN NOTE
While tapering off propranolol with mildly elevated blood pressure we will start triamterene hydrochlorothiazide 1 tablet daily 37 5/25 patient with dependent edema secondary to chronic venous insufficiency and chronic lymphedema of lower extremities stable at this time.  Use of furosemide on as-needed basis reevaluate with lab work  Orders:    triamterene-hydrochlorothiazid (Maxzide-25) 37.5-25 mg tablet; Take 1 tablet by mouth once daily.    Comprehensive metabolic panel; Future    Follow Up In [facility] - PCP - Medicare Annual; Future

## 2024-10-13 NOTE — ASSESSMENT & PLAN NOTE
Taper off propranolol due to little response with tremor and start titration of primidone starting at 50 mg nightly and titrating by 50 mg a week to 200 mg nightly reevaluate in 3 months  Orders:    primidone (Mysoline) 50 mg tablet; Take 1 tablet (50 mg) by mouth 4 times a day.    triamterene-hydrochlorothiazid (Maxzide-25) 37.5-25 mg tablet; Take 1 tablet by mouth once daily.    Comprehensive metabolic panel; Future    Follow Up In [facility] - PCP - Medicare Annual; Future

## 2024-10-13 NOTE — ASSESSMENT & PLAN NOTE
Continue to monitor immunoglobin levels with hematology oncology stable at this time no evidence of recurrent infections

## 2024-10-14 ENCOUNTER — OFFICE VISIT (OUTPATIENT)
Dept: HEMATOLOGY/ONCOLOGY | Facility: CLINIC | Age: 74
End: 2024-10-14
Payer: MEDICARE

## 2024-10-14 VITALS
DIASTOLIC BLOOD PRESSURE: 82 MMHG | WEIGHT: 216.49 LBS | HEART RATE: 65 BPM | RESPIRATION RATE: 16 BRPM | HEIGHT: 66 IN | TEMPERATURE: 97.7 F | SYSTOLIC BLOOD PRESSURE: 159 MMHG | OXYGEN SATURATION: 99 % | BODY MASS INDEX: 34.79 KG/M2

## 2024-10-14 DIAGNOSIS — C91.11 CHRONIC LYMPHOCYTIC LEUKEMIA IN REMISSION (MULTI): ICD-10-CM

## 2024-10-14 LAB
BASOPHILS # BLD AUTO: 0.03 X10*3/UL (ref 0–0.1)
BASOPHILS NFR BLD AUTO: 0.7 %
EOSINOPHIL # BLD AUTO: 0.18 X10*3/UL (ref 0–0.4)
EOSINOPHIL NFR BLD AUTO: 4.2 %
ERYTHROCYTE [DISTWIDTH] IN BLOOD BY AUTOMATED COUNT: 13.4 % (ref 11.5–14.5)
HCT VFR BLD AUTO: 39.9 % (ref 36–46)
HGB BLD-MCNC: 12.5 G/DL (ref 12–16)
IMM GRANULOCYTES # BLD AUTO: 0.02 X10*3/UL (ref 0–0.5)
IMM GRANULOCYTES NFR BLD AUTO: 0.5 % (ref 0–0.9)
LYMPHOCYTES # BLD AUTO: 1.47 X10*3/UL (ref 0.8–3)
LYMPHOCYTES NFR BLD AUTO: 34.3 %
MCH RBC QN AUTO: 26.7 PG (ref 26–34)
MCHC RBC AUTO-ENTMCNC: 31.3 G/DL (ref 32–36)
MCV RBC AUTO: 85 FL (ref 80–100)
MONOCYTES # BLD AUTO: 0.43 X10*3/UL (ref 0.05–0.8)
MONOCYTES NFR BLD AUTO: 10 %
NEUTROPHILS # BLD AUTO: 2.15 X10*3/UL (ref 1.6–5.5)
NEUTROPHILS NFR BLD AUTO: 50.3 %
PLATELET # BLD AUTO: 248 X10*3/UL (ref 150–450)
RBC # BLD AUTO: 4.68 X10*6/UL (ref 4–5.2)
WBC # BLD AUTO: 4.3 X10*3/UL (ref 4.4–11.3)

## 2024-10-14 PROCEDURE — 36415 COLL VENOUS BLD VENIPUNCTURE: CPT | Performed by: INTERNAL MEDICINE

## 2024-10-14 PROCEDURE — 1160F RVW MEDS BY RX/DR IN RCRD: CPT | Performed by: INTERNAL MEDICINE

## 2024-10-14 PROCEDURE — 99214 OFFICE O/P EST MOD 30 MIN: CPT | Performed by: INTERNAL MEDICINE

## 2024-10-14 PROCEDURE — 85025 COMPLETE CBC W/AUTO DIFF WBC: CPT | Performed by: INTERNAL MEDICINE

## 2024-10-14 PROCEDURE — 3079F DIAST BP 80-89 MM HG: CPT | Performed by: INTERNAL MEDICINE

## 2024-10-14 PROCEDURE — 3008F BODY MASS INDEX DOCD: CPT | Performed by: INTERNAL MEDICINE

## 2024-10-14 PROCEDURE — 1126F AMNT PAIN NOTED NONE PRSNT: CPT | Performed by: INTERNAL MEDICINE

## 2024-10-14 PROCEDURE — 3077F SYST BP >= 140 MM HG: CPT | Performed by: INTERNAL MEDICINE

## 2024-10-14 PROCEDURE — 1159F MED LIST DOCD IN RCRD: CPT | Performed by: INTERNAL MEDICINE

## 2024-10-14 ASSESSMENT — NCCN CANCER DISTRESS MANAGEMENT: NCCN OTHER CONCERNS: UNABLE TO LOSE WEIGHT

## 2024-10-14 ASSESSMENT — PAIN SCALES - GENERAL: PAINLEVEL: 0-NO PAIN

## 2024-10-14 NOTE — PATIENT INSTRUCTIONS
Follow up visit for history of CLL diagnosed in 2010.     Return for routine follow up with Dr. Zhao in 6 months.

## 2024-10-14 NOTE — PROGRESS NOTES
Patient Visit Information:   Visit Type: Follow Up Visit      Cancer History:   Treatment Synopsis:    Chronic lymphocytic leukemia diagnosed 2010.   2010: Bone marrow showed chronic lymphocytic leukemia/small lymphocytic lymphoma with CD19,CD20, CD5, and CD23 positive and lambda immunoglobulin light chains.   CD38 was moderate. 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  2015: Developed significant adenopathy, B symptoms.  July-2015: Fludarabine/rituximab x 6 cycles.  August-2019: Bendamustine/rituximab x 4 cycles.  Remission.  10/19/2021: WBC 5.2.  ALC 0.3.  Hemoglobin 12.2.  Platelets 138,000.   4/15/2022: WBC 4.7.  A.7.  Hemoglobin 12.1.  Platelets 156,000.  In remission.  10/15/2022: WBC 4.2  A.13 Hemoglobin 11.8.  Platelets 161,000.   2023: WBC 4.5.  ALC 0.9.  Hemoglobin 11.0.  Platelets 167,000.  CMP, iron studies: Normal.   10/12/23 , WBC count 3.8, hemoglobin 13.3, platelets 200   4/15/24 ,   WBC count 3.7, hemoglobin 11.8, platelets 196, 54% neutrophil, 32% lymphocyte  10/14/24 , WBC count 4.3, hemoglobin 12.5, platelets 248, neutrophil 50%, lymphocyte 34%, ALC 1.47  History of Present Illness:      ID Statement:    REINA KNOWLES is a 72 year old Female        Chief Complaint: Office visit for treatment of chronic  lymphocytic leukemia.   Interval History:    10/14/24  Reina Knowles is a 73-year-old female, with history of CLL, who presents for continuation of care.  She is doing very well, no B symptoms.  Lower GI symptoms, pancreatic lesions have been stable which are documented by previous MRI done in 2023    Past medical history: CLL diagnosed , begin treatment with FIR  and received 4 cycles BR , as described above.  In remission.  History of hypogammaglobulinemia, receives IVIG infusions.  Positive Cologuard test, pancreatic lesion (monitored  by Dr. Morgan), neurologic condition, cataract surgery.  COVID-19 infection, long-haul, December  2020-August 2021. Colonoscopy 4/20/22- two polyps (benign)- recommended follow-up in 3-5 years.      Review of Systems:   Review of Systems:    Constitutional: Feeling well, no fatigue or weakness.  Head and neck: No headaches or dizziness.     HEENT: No sore throat or sinusitis.  Hearing is normal eyesight is good.  Cardiac: No chest pain or palpitations.  Pulmonary: no cough or shortness of breath.  GI: Appetite is good and weight stable.  No constipation or diarrhea.  No abdominal pain  Genitourinary: No frequency or urgency.  No polyuria or dysuria.  Musculocutaneous: No arthritis.  Endocrine: No diabetes no thyroid disease.  Skin: No rash or itching.  Neuromuscular : no fainting or dizziness.  No history of convulsions.  No tingling or numbness.           Allergies and Intolerances:       Allergies:         azithromycin: Drug, Unknown, Active     Outpatient Medication Profile:  * Patient Currently Takes Medications as of 14-Apr-2023 12:49 documented in Structured Notes         Depakote  mg oral tablet, extended release : Last Dose Taken:  , 1 tab(s) orally once a day         propranolol 40 mg oral tablet: Last Dose Taken:  , 1 tab(s) orally 2  times a day             Medical History:         Seizure: ICD-10: R56.9, Status: Active         COVID-19: ICD-10: U07.1, Status: Active         Hypogammaglobulinemia: ICD-10: D80.1, Status: Active         Chronic cough: ICD-10: R05, Status: Active         Chronic sinusitis: ICD-10: J32.9, Status: Active         Neutropenia: ICD-10: D70.9, Status: Active         Chronic lymphocytic leukemia: ICD-10: C91.10, Status: Active       Surg History:         History of lymph node biopsy: ICD-10: Z98.89, Status: Active     Family History: Family history of neoplasm of brain  (Maternal Grandmother Age Unknown)      Social History:   Social Substance History:  ·  Smoking Status never smoker   ·  Additional History     Social history: She works at a bank.(1)           Vitals and  Measurements:   Vitals: Temp: 36.2  HR: 58  RR: 16  BP: 118/64  SPO2%:   98   Measurements: HT(cm): 167.9  WT(kg): 98.4  BSA: 2.14   BMI:  34.9      Physical Exam:      Constitutional: Well developed, awake/alert/oriented  x3.   Eyes: PERRL, EOMI, clear sclera.   ENMT: No external lesions noted.   Head/Neck: Neck with normal movement.  No mass, adenopathy  or tenderness.  No JVD. Trachea midline.   Respiratory/Thorax: Thorax symmetric. Clear to auscultation.   No wheezes, rales, rhonchi.   Cardiovascular: Regular, rate and rhythm. No murmur.   No rubs or gallops.   Gastrointestinal: Nondistended.  Normal bowel sounds.   Soft, non-tender. No rebound or guarding. No masses palpable.  No palpable hepatomegaly, splenomegaly.   Musculoskeletal: ROM intact.  No significant joint  swelling. Adequate strength. No significant deformity.   Extremities: Mild distal lower extremity edema, chronic,  stable per patient.   Neurological: Alert and oriented x3. Senses and cranial  nerves grossly intact. No focal motor deficits noted. No focal sensory deficits.   Lymphatic: No palpably significant lymphadenopathy  in the neck, supraclavicular, axillary areas or other areas.   Psychological: Appropriate mood and behavior.   Skin: Warm and dry, no rashes, no suspicious lesions.         Lab Results:         Units 10:00  (10/14/24) 6 mo ago  (4/15/24) 1 yr ago  (10/12/23) 1 yr ago  (4/14/23) 2 yr ago  (10/14/22) 2 yr ago  (4/15/22) 2 yr ago  (10/19/21)   WBC  4.4 - 11.3 x10*3/uL 4.3 Low  3.7 Low  3.8 Low  4.5 R 4.2 Low  R 4.7 R 5.2 R   RBC  4.00 - 5.20 x10*6/uL 4.68 4.17 4.85 4.01 R 4.24 R 4.45 R 4.30 R   Hemoglobin  12.0 - 16.0 g/dL 12.5 11.8 Low  13.3 11.0 Low  11.8 Low  12.1 12.2   Hematocrit  36.0 - 46.0 % 39.9 36.3 41.2 34.3 Low  36.7 38.2 37.5   MCV  80 - 100 fL 85 87 85 86 87 86 87   MCH  26.0 - 34.0 pg 26.7 28.3 27.4       MCHC  32.0 - 36.0 g/dL 31.3 Low  32.5 32.3 32.1 32.2 31.7 Low  32.5   RDW  11.5 - 14.5 % 13.4 14.1 14.5 14.1  "15.5 High  14.0 13.0   Platelets  150 - 450 x10*3/uL 248 196 200 167 R 161 R 156 R 138 Low  R   Neutrophils %  40.0 - 80.0 % 50.3 54.7 60.0 66.5 56.5 69.4 84.3   Immature Granulocytes %, Automated  0.0 - 0.9 % 0.5 0.3 CM 0.3 CM 0.2 CM 0.5 CM 0.0 CM 0.2 CM   Comment: Immature Granulocyte Count (IG) includes promyelocytes, myelocytes and metamyelocytes but does not include bands. Percent differential counts (%) should be interpreted in the context of the absolute cell counts (cells/UL).   Lymphocytes %  13.0 - 44.0 % 34.3                 Assessment and Plan:   Assessment:        1.  Chronic lymphocytic leukemia, IGVH mutated, diagnosed 2010, s/p chemotherapy with FR 2015 and BR 2019/20.  In remission.      2.  Abnormal Cologuard test. Colonoscopy 4/20/2022- 2 polyps (benign)- recommended follow-up 3-5 years.     3.  History of pancreatic lesion, reportedly stable.      4.  Multiple medical problems.       5.  Slowly progressing anemia, rule out due to chronic disease, occult blood loss or bone marrow insufficiency.     Plan: In terms of chronic lymphocytic leukemia no evidence of progression of disease, lab result discussed with patient follow-up after 6 months.     Total time =20 minutes. 50% or more of this time was spent in counseling and/or coordination of care including reviewing medical history/radiology/labs, examining patient, formulating outlined plan  with team, and discussing plan with patient/family.  I reviewed patient's chart including but not limited to labs, imaging, surgical/procedure notes, pathology, hospital notes, doctor's notes.                                            Patient Instructions:      Instructions Type: nutrition            Note Recipients: Ej Morgan MD Yanelli, Emmanuel, DO   Select \"Yes\" when ready to send to Provider(s) Listed Above:  Note sent to providers named above     "

## 2024-10-24 DIAGNOSIS — E66.811 CLASS 1 OBESITY DUE TO EXCESS CALORIES WITH SERIOUS COMORBIDITY AND BODY MASS INDEX (BMI) OF 34.0 TO 34.9 IN ADULT: ICD-10-CM

## 2024-10-24 DIAGNOSIS — D80.1 NONFAMILIAL HYPOGAMMAGLOBULINEMIA (MULTI): ICD-10-CM

## 2024-10-24 DIAGNOSIS — E66.09 CLASS 1 OBESITY DUE TO EXCESS CALORIES WITH SERIOUS COMORBIDITY AND BODY MASS INDEX (BMI) OF 33.0 TO 33.9 IN ADULT: ICD-10-CM

## 2024-10-24 DIAGNOSIS — G25.0 ESSENTIAL TREMOR: ICD-10-CM

## 2024-10-24 DIAGNOSIS — D49.0 IPMN (INTRADUCTAL PAPILLARY MUCINOUS NEOPLASM): ICD-10-CM

## 2024-10-24 DIAGNOSIS — C91.11 CHRONIC LYMPHOCYTIC LEUKEMIA IN REMISSION (MULTI): ICD-10-CM

## 2024-10-24 DIAGNOSIS — G40.909 SEIZURE DISORDER (MULTI): ICD-10-CM

## 2024-10-24 DIAGNOSIS — E66.09 CLASS 1 OBESITY DUE TO EXCESS CALORIES WITH SERIOUS COMORBIDITY AND BODY MASS INDEX (BMI) OF 34.0 TO 34.9 IN ADULT: ICD-10-CM

## 2024-10-24 DIAGNOSIS — E66.811 CLASS 1 OBESITY DUE TO EXCESS CALORIES WITH SERIOUS COMORBIDITY AND BODY MASS INDEX (BMI) OF 33.0 TO 33.9 IN ADULT: ICD-10-CM

## 2024-10-24 RX ORDER — PROPRANOLOL HYDROCHLORIDE 120 MG/1
120 CAPSULE, EXTENDED RELEASE ORAL DAILY
Qty: 90 CAPSULE | Refills: 3 | Status: SHIPPED | OUTPATIENT
Start: 2024-10-24

## 2024-11-02 DIAGNOSIS — G25.0 DISABLING ESSENTIAL TREMOR: ICD-10-CM

## 2024-11-04 RX ORDER — PRIMIDONE 50 MG/1
50 TABLET ORAL 4 TIMES DAILY
Qty: 360 TABLET | Refills: 1 | Status: SHIPPED | OUTPATIENT
Start: 2024-11-04 | End: 2025-11-04

## 2025-01-10 ENCOUNTER — LAB (OUTPATIENT)
Dept: LAB | Facility: LAB | Age: 75
End: 2025-01-10
Payer: MEDICARE

## 2025-01-10 DIAGNOSIS — C91.11 CHRONIC LYMPHOCYTIC LEUKEMIA IN REMISSION (MULTI): ICD-10-CM

## 2025-01-10 DIAGNOSIS — R73.02 IGT (IMPAIRED GLUCOSE TOLERANCE): ICD-10-CM

## 2025-01-10 DIAGNOSIS — G25.0 DISABLING ESSENTIAL TREMOR: ICD-10-CM

## 2025-01-10 DIAGNOSIS — I10 PRIMARY HYPERTENSION: ICD-10-CM

## 2025-01-10 LAB
ALBUMIN SERPL BCP-MCNC: 4 G/DL (ref 3.4–5)
ALP SERPL-CCNC: 68 U/L (ref 33–136)
ALT SERPL W P-5'-P-CCNC: 13 U/L (ref 7–45)
ANION GAP SERPL CALC-SCNC: 12 MMOL/L (ref 10–20)
AST SERPL W P-5'-P-CCNC: 17 U/L (ref 9–39)
BILIRUB SERPL-MCNC: 1 MG/DL (ref 0–1.2)
BUN SERPL-MCNC: 17 MG/DL (ref 6–23)
CALCIUM SERPL-MCNC: 9 MG/DL (ref 8.6–10.3)
CHLORIDE SERPL-SCNC: 104 MMOL/L (ref 98–107)
CO2 SERPL-SCNC: 28 MMOL/L (ref 21–32)
CREAT SERPL-MCNC: 0.71 MG/DL (ref 0.5–1.05)
EGFRCR SERPLBLD CKD-EPI 2021: 89 ML/MIN/1.73M*2
GLUCOSE SERPL-MCNC: 96 MG/DL (ref 74–99)
POTASSIUM SERPL-SCNC: 4.2 MMOL/L (ref 3.5–5.3)
PROT SERPL-MCNC: 6.1 G/DL (ref 6.4–8.2)
SODIUM SERPL-SCNC: 140 MMOL/L (ref 136–145)

## 2025-01-10 PROCEDURE — 80053 COMPREHEN METABOLIC PANEL: CPT

## 2025-01-13 ENCOUNTER — APPOINTMENT (OUTPATIENT)
Dept: PRIMARY CARE | Facility: CLINIC | Age: 75
End: 2025-01-13
Payer: MEDICARE

## 2025-01-13 VITALS
WEIGHT: 219 LBS | BODY MASS INDEX: 34.37 KG/M2 | HEART RATE: 68 BPM | HEIGHT: 67 IN | DIASTOLIC BLOOD PRESSURE: 80 MMHG | SYSTOLIC BLOOD PRESSURE: 122 MMHG

## 2025-01-13 DIAGNOSIS — D80.1 NONFAMILIAL HYPOGAMMAGLOBULINEMIA (MULTI): ICD-10-CM

## 2025-01-13 DIAGNOSIS — E66.09 CLASS 1 OBESITY DUE TO EXCESS CALORIES WITH SERIOUS COMORBIDITY AND BODY MASS INDEX (BMI) OF 34.0 TO 34.9 IN ADULT: ICD-10-CM

## 2025-01-13 DIAGNOSIS — C91.11 CHRONIC LYMPHOCYTIC LEUKEMIA IN REMISSION (MULTI): ICD-10-CM

## 2025-01-13 DIAGNOSIS — C85.91 LYMPHOMA OF LYMPH NODES OF NECK, UNSPECIFIED LYMPHOMA TYPE (MULTI): ICD-10-CM

## 2025-01-13 DIAGNOSIS — Z00.00 MEDICARE ANNUAL WELLNESS VISIT, SUBSEQUENT: Primary | ICD-10-CM

## 2025-01-13 DIAGNOSIS — Z12.31 SCREENING MAMMOGRAM FOR BREAST CANCER: ICD-10-CM

## 2025-01-13 DIAGNOSIS — G25.0 BENIGN ESSENTIAL TREMOR: ICD-10-CM

## 2025-01-13 DIAGNOSIS — R73.02 IGT (IMPAIRED GLUCOSE TOLERANCE): ICD-10-CM

## 2025-01-13 DIAGNOSIS — E66.811 CLASS 1 OBESITY DUE TO EXCESS CALORIES WITH SERIOUS COMORBIDITY AND BODY MASS INDEX (BMI) OF 34.0 TO 34.9 IN ADULT: ICD-10-CM

## 2025-01-13 PROCEDURE — 1123F ACP DISCUSS/DSCN MKR DOCD: CPT | Performed by: INTERNAL MEDICINE

## 2025-01-13 PROCEDURE — G0444 DEPRESSION SCREEN ANNUAL: HCPCS | Performed by: INTERNAL MEDICINE

## 2025-01-13 PROCEDURE — 1036F TOBACCO NON-USER: CPT | Performed by: INTERNAL MEDICINE

## 2025-01-13 PROCEDURE — 3079F DIAST BP 80-89 MM HG: CPT | Performed by: INTERNAL MEDICINE

## 2025-01-13 PROCEDURE — G0439 PPPS, SUBSEQ VISIT: HCPCS | Performed by: INTERNAL MEDICINE

## 2025-01-13 PROCEDURE — 3008F BODY MASS INDEX DOCD: CPT | Performed by: INTERNAL MEDICINE

## 2025-01-13 PROCEDURE — 1159F MED LIST DOCD IN RCRD: CPT | Performed by: INTERNAL MEDICINE

## 2025-01-13 PROCEDURE — 1158F ADVNC CARE PLAN TLK DOCD: CPT | Performed by: INTERNAL MEDICINE

## 2025-01-13 PROCEDURE — 99214 OFFICE O/P EST MOD 30 MIN: CPT | Performed by: INTERNAL MEDICINE

## 2025-01-13 PROCEDURE — 99397 PER PM REEVAL EST PAT 65+ YR: CPT | Performed by: INTERNAL MEDICINE

## 2025-01-13 PROCEDURE — G0447 BEHAVIOR COUNSEL OBESITY 15M: HCPCS | Performed by: INTERNAL MEDICINE

## 2025-01-13 PROCEDURE — 1170F FXNL STATUS ASSESSED: CPT | Performed by: INTERNAL MEDICINE

## 2025-01-13 PROCEDURE — 99497 ADVNCD CARE PLAN 30 MIN: CPT | Performed by: INTERNAL MEDICINE

## 2025-01-13 PROCEDURE — 3074F SYST BP LT 130 MM HG: CPT | Performed by: INTERNAL MEDICINE

## 2025-01-13 PROCEDURE — 1160F RVW MEDS BY RX/DR IN RCRD: CPT | Performed by: INTERNAL MEDICINE

## 2025-01-13 ASSESSMENT — ANXIETY QUESTIONNAIRES
6. BECOMING EASILY ANNOYED OR IRRITABLE: NOT AT ALL
3. WORRYING TOO MUCH ABOUT DIFFERENT THINGS: NOT AT ALL
2. NOT BEING ABLE TO STOP OR CONTROL WORRYING: NOT AT ALL
5. BEING SO RESTLESS THAT IT IS HARD TO SIT STILL: NOT AT ALL
1. FEELING NERVOUS, ANXIOUS, OR ON EDGE: NOT AT ALL
7. FEELING AFRAID AS IF SOMETHING AWFUL MIGHT HAPPEN: NOT AT ALL
GAD7 TOTAL SCORE: 0
4. TROUBLE RELAXING: NOT AT ALL
IF YOU CHECKED OFF ANY PROBLEMS ON THIS QUESTIONNAIRE, HOW DIFFICULT HAVE THESE PROBLEMS MADE IT FOR YOU TO DO YOUR WORK, TAKE CARE OF THINGS AT HOME, OR GET ALONG WITH OTHER PEOPLE: NOT DIFFICULT AT ALL

## 2025-01-13 ASSESSMENT — ACTIVITIES OF DAILY LIVING (ADL)
BATHING: INDEPENDENT
DRESSING: INDEPENDENT
TAKING_MEDICATION: INDEPENDENT
DOING_HOUSEWORK: INDEPENDENT
MANAGING_FINANCES: INDEPENDENT
GROCERY_SHOPPING: INDEPENDENT

## 2025-01-13 ASSESSMENT — ENCOUNTER SYMPTOMS
LOSS OF SENSATION IN FEET: 0
DEPRESSION: 0
OCCASIONAL FEELINGS OF UNSTEADINESS: 0

## 2025-01-13 NOTE — ASSESSMENT & PLAN NOTE
Patient to reinitiate trial of weight watchers which success along with joining gym membership with added consistency and intermittent fasting to improve BMI goal less than 30 reevaluate next visit  The patient received Current weight: 99.3 kg (219 lb)  Weight change since last visit (-) denotes wt loss 2.51 lbs   Weight loss needed to achieve BMI 25: 59.7 Lbs  Weight loss needed to achieve BMI 30: 27.9 Lbs    Provided instructions on dietary changes  Provided instructions on exercise  Advised to Increase physical activity because they have an above normal BMI.

## 2025-01-13 NOTE — PROGRESS NOTES
"Subjective   Reason for Visit: Reina Knowles is an 74 y.o. female here for a Medicare Wellness visit.          Reviewed all medications by prescribing practitioner or clinical pharmacist (such as prescriptions, OTCs, herbal therapies and supplements) and documented in the medical record.    HPI    Patient Care Team:  Dionte Lovett DO as PCP - General  Dionte Lovett DO as PCP - [employer] PCP  Maximo Zhao MD as Consulting Physician (Hematology and Oncology)     Review of Systems    Objective   Vitals:  /80   Pulse 68   Ht 1.702 m (5' 7\")   Wt 99.3 kg (219 lb)   LMP  (LMP Unknown)   BMI 34.30 kg/m²       Physical Exam    Assessment & Plan  Chronic lymphocytic leukemia in remission (Multi)    Orders:    Follow Up In [facility] - [facility] - Medicare Annual    IGT (impaired glucose tolerance)    Orders:    Follow Up In [facility] - [facility] - Medicare Annual    Disabling essential tremor    Orders:    Follow Up In [facility] - [facility] - Medicare Annual    Primary hypertension    Orders:    Follow Up In [facility] - [facility] - Medicare Annual              "

## 2025-01-13 NOTE — PROGRESS NOTES
"Subjective   Reason for Visit: Reina Knowles is an 74 y.o. female here for a Medicare Wellness visit.     Past Medical, Surgical, and Family History reviewed and updated in chart.    Reviewed all medications by prescribing practitioner or clinical pharmacist (such as prescriptions, OTCs, herbal therapies and supplements) and documented in the medical record.    HPI    Patient Care Team:  Dionte Lovett DO as PCP - General  Dionte Lovett DO as PCP - [employer] PCP  Maximo Zhao MD as Consulting Physician (Hematology and Oncology)     Review of Systems   All other systems reviewed and are negative.      Objective   Vitals:  /80   Pulse 68   Ht 1.702 m (5' 7\")   Wt 99.3 kg (219 lb)   LMP  (LMP Unknown)   BMI 34.30 kg/m²       Physical Exam  Vitals and nursing note reviewed.   Constitutional:       General: She is not in acute distress.     Appearance: Normal appearance. She is well-developed. She is obese. She is not toxic-appearing.   HENT:      Head: Normocephalic and atraumatic.      Right Ear: Tympanic membrane and external ear normal.      Left Ear: Tympanic membrane and external ear normal.      Nose: Nose normal.      Mouth/Throat:      Mouth: Mucous membranes are moist.      Pharynx: Oropharynx is clear. No oropharyngeal exudate or posterior oropharyngeal erythema.      Tonsils: No tonsillar exudate. 2+ on the right. 2+ on the left.   Eyes:      Extraocular Movements: Extraocular movements intact.      Conjunctiva/sclera: Conjunctivae normal.   Cardiovascular:      Rate and Rhythm: Normal rate and regular rhythm.      Pulses: Normal pulses.      Heart sounds: Normal heart sounds. No murmur heard.  Pulmonary:      Effort: Pulmonary effort is normal.      Breath sounds: Normal breath sounds.   Abdominal:      General: Abdomen is flat. Bowel sounds are normal.      Palpations: Abdomen is soft.   Musculoskeletal:      Cervical back: Neck supple.   Lymphadenopathy:      " Cervical: No cervical adenopathy.   Skin:     General: Skin is warm and dry.      Findings: No rash.   Neurological:      Mental Status: She is alert. Mental status is at baseline.   Psychiatric:         Mood and Affect: Mood normal.         Behavior: Behavior normal.         Thought Content: Thought content normal.         Judgment: Judgment normal.     Lifestyle Recommendations  I recommend a whole-food plant-based diet, an eating pattern that encourages the consumption of unrefined plant foods (such as fruits, vegetables, tubers, whole grains, legumes, nuts and seeds) and discourages meats, dairy products, eggs and processed foods.     The AHA/ACC recommends that the patient consume a dietary pattern that emphasizes intake of vegetables, fruits, and whole grains; includes low-fat dairy products, poultry, fish, legumes, non-tropical vegetable oils, and nuts; and limits intake of sodium, sweets, sugar-sweetened beverages, and red meats.  Adapt this dietary pattern to appropriate calorie requirements (a 500-750 kcal/day deficit to loose weight), personal and cultural food preferences, and nutrition therapy for other medical conditions (including diabetes).  Achieve this pattern by following plans such as the [place] Mediterranean, DASH dietary pattern, or AHA diet.     Engage in 2 hours and 30 minutes per week of moderate-intensity physical activity, or 1 hour and 15 minutes (75 minutes) per week of vigorous-intensity aerobic physical activity, or an equivalent combination of moderate and vigorous-intensity aerobic physical activity. Aerobic activity should be performed in episodes of at least 10 minutes preferably spread throughout the week.     Adhering to a heart healthy diet, regular exercise habits, avoidance of tobacco products, and maintenance of a healthy weight are crucial components of their heart disease risk reduction.    Assessment & Plan  Chronic lymphocytic leukemia in remission (Multi)  Continues in  sustained remission at this time denies symptoms night sweats unintended weight loss fevers infections lymphadenopathy or splenomegaly continue to monitor 6-month basis  Orders:    Follow Up In [facility] - PCP - Medicare Annual    Comprehensive Metabolic Panel; Future    Hemoglobin A1C; Future    Lipid Panel; Future    Albumin-Creatinine Ratio, Urine Random; Future    CBC and Auto Differential; Future    Follow Up In [facility]; Future    IGT (impaired glucose tolerance)  Fasting blood sugar of 96 with hemoglobin A1c improved to 6.0 continue to follow 6-month basis  Orders:    Follow Up In [facility] - PCP - Medicare Annual    Comprehensive Metabolic Panel; Future    Hemoglobin A1C; Future    Lipid Panel; Future    Albumin-Creatinine Ratio, Urine Random; Future    Follow Up In [facility]; Future    Medicare annual wellness visit, subsequent  Up-to-date with vaccinations and screenings ordered screening mammogram patient to join weight watchers again to assist with weight did not tolerate trial of GLP-1 agonist due to side effects outlined dietary plan with intermittent fasting reevaluate next visit       Screening mammogram for breast cancer  Orders for screening mammogram completed today follow-up 6 months  Orders:    BI mammo bilateral screening tomosynthesis; Future    Class 1 obesity due to excess calories with serious comorbidity and body mass index (BMI) of 34.0 to 34.9 in adult  Patient to reinitiate trial of weight watchers which success along with joining gym membership with added consistency and intermittent fasting to improve BMI goal less than 30 reevaluate next visit  The patient received Current weight: 99.3 kg (219 lb)  Weight change since last visit (-) denotes wt loss 2.51 lbs   Weight loss needed to achieve BMI 25: 59.7 Lbs  Weight loss needed to achieve BMI 30: 27.9 Lbs    Provided instructions on dietary  changes  Provided instructions on exercise  Advised to Increase physical activity because they have an above normal BMI.        Lymphoma of lymph nodes of neck, unspecified lymphoma type (Multi)  Stable in remission continues to follow with oncology specialist       Benign essential tremor  Mild symptoms patient discontinued propranolol and primidone blood pressure stable would like to monitor for now without medication consider referral to neurology movement specialist if symptoms persist or worsen       Nonfamilial hypogammaglobulinemia (Multi)  Stable at this time does not require infusion therapy

## 2025-01-13 NOTE — ASSESSMENT & PLAN NOTE
Orders:    Follow Up In [facility] - PCP - Medicare Annual    Comprehensive Metabolic Panel; Future    Hemoglobin A1C; Future    Lipid Panel; Future    Albumin-Creatinine Ratio, Urine Random; Future

## 2025-01-13 NOTE — ASSESSMENT & PLAN NOTE
Orders for screening mammogram completed today follow-up 6 months  Orders:    BI mammo bilateral screening tomosynthesis; Future

## 2025-01-13 NOTE — ASSESSMENT & PLAN NOTE
Up-to-date with vaccinations and screenings ordered screening mammogram patient to join weight watchers again to assist with weight did not tolerate trial of GLP-1 agonist due to side effects outlined dietary plan with intermittent fasting reevaluate next visit

## 2025-01-13 NOTE — ASSESSMENT & PLAN NOTE
Fasting blood sugar of 96 with hemoglobin A1c improved to 6.0 continue to follow 6-month basis  Orders:    Follow Up In [facility] - PCP - Medicare Annual    Comprehensive Metabolic Panel; Future    Hemoglobin A1C; Future    Lipid Panel; Future    Albumin-Creatinine Ratio, Urine Random; Future    Follow Up In [facility] - PCP - Established; Future

## 2025-01-13 NOTE — ASSESSMENT & PLAN NOTE
Continues in sustained remission at this time denies symptoms night sweats unintended weight loss fevers infections lymphadenopathy or splenomegaly continue to monitor 6-month basis  Orders:    Follow Up In [facility] - PCP - Medicare Annual    Comprehensive Metabolic Panel; Future    Hemoglobin A1C; Future    Lipid Panel; Future    Albumin-Creatinine Ratio, Urine Random; Future    CBC and Auto Differential; Future    Follow Up In [facility] - PCP - Established; Future

## 2025-01-13 NOTE — ASSESSMENT & PLAN NOTE
Mild symptoms patient discontinued propranolol and primidone blood pressure stable would like to monitor for now without medication consider referral to neurology movement specialist if symptoms persist or worsen

## 2025-04-21 ENCOUNTER — OFFICE VISIT (OUTPATIENT)
Dept: HEMATOLOGY/ONCOLOGY | Facility: CLINIC | Age: 75
End: 2025-04-21
Payer: MEDICARE

## 2025-04-21 VITALS
TEMPERATURE: 95.9 F | SYSTOLIC BLOOD PRESSURE: 138 MMHG | WEIGHT: 203.26 LBS | BODY MASS INDEX: 32.67 KG/M2 | HEART RATE: 71 BPM | HEIGHT: 66 IN | RESPIRATION RATE: 16 BRPM | DIASTOLIC BLOOD PRESSURE: 85 MMHG | OXYGEN SATURATION: 97 %

## 2025-04-21 DIAGNOSIS — C91.11 CHRONIC LYMPHOCYTIC LEUKEMIA IN REMISSION (MULTI): ICD-10-CM

## 2025-04-21 LAB
ALBUMIN SERPL BCP-MCNC: 3.9 G/DL (ref 3.4–5)
ALP SERPL-CCNC: 69 U/L (ref 33–136)
ALT SERPL W P-5'-P-CCNC: 12 U/L (ref 7–45)
ANION GAP SERPL CALC-SCNC: 11 MMOL/L (ref 10–20)
AST SERPL W P-5'-P-CCNC: 20 U/L (ref 9–39)
BASOPHILS # BLD AUTO: 0.02 X10*3/UL (ref 0–0.1)
BASOPHILS NFR BLD AUTO: 0.4 %
BILIRUB SERPL-MCNC: 1 MG/DL (ref 0–1.2)
BUN SERPL-MCNC: 13 MG/DL (ref 6–23)
CALCIUM SERPL-MCNC: 9 MG/DL (ref 8.6–10.3)
CHLORIDE SERPL-SCNC: 103 MMOL/L (ref 98–107)
CO2 SERPL-SCNC: 28 MMOL/L (ref 21–32)
CREAT SERPL-MCNC: 0.92 MG/DL (ref 0.5–1.05)
EGFRCR SERPLBLD CKD-EPI 2021: 65 ML/MIN/1.73M*2
EOSINOPHIL # BLD AUTO: 0.1 X10*3/UL (ref 0–0.4)
EOSINOPHIL NFR BLD AUTO: 2 %
ERYTHROCYTE [DISTWIDTH] IN BLOOD BY AUTOMATED COUNT: 14.5 % (ref 11.5–14.5)
GLUCOSE SERPL-MCNC: 98 MG/DL (ref 74–99)
HCT VFR BLD AUTO: 38.5 % (ref 36–46)
HGB BLD-MCNC: 12.4 G/DL (ref 12–16)
IMM GRANULOCYTES # BLD AUTO: 0.04 X10*3/UL (ref 0–0.5)
IMM GRANULOCYTES NFR BLD AUTO: 0.8 % (ref 0–0.9)
LYMPHOCYTES # BLD AUTO: 1.06 X10*3/UL (ref 0.8–3)
LYMPHOCYTES NFR BLD AUTO: 21.6 %
MCH RBC QN AUTO: 26.6 PG (ref 26–34)
MCHC RBC AUTO-ENTMCNC: 32.2 G/DL (ref 32–36)
MCV RBC AUTO: 83 FL (ref 80–100)
MONOCYTES # BLD AUTO: 0.39 X10*3/UL (ref 0.05–0.8)
MONOCYTES NFR BLD AUTO: 7.9 %
NEUTROPHILS # BLD AUTO: 3.3 X10*3/UL (ref 1.6–5.5)
NEUTROPHILS NFR BLD AUTO: 67.3 %
PLATELET # BLD AUTO: 222 X10*3/UL (ref 150–450)
POTASSIUM SERPL-SCNC: 3.9 MMOL/L (ref 3.5–5.3)
PROT SERPL-MCNC: 6.5 G/DL (ref 6.4–8.2)
RBC # BLD AUTO: 4.66 X10*6/UL (ref 4–5.2)
SODIUM SERPL-SCNC: 138 MMOL/L (ref 136–145)
WBC # BLD AUTO: 4.9 X10*3/UL (ref 4.4–11.3)

## 2025-04-21 PROCEDURE — 36415 COLL VENOUS BLD VENIPUNCTURE: CPT | Performed by: INTERNAL MEDICINE

## 2025-04-21 PROCEDURE — 85025 COMPLETE CBC W/AUTO DIFF WBC: CPT | Performed by: INTERNAL MEDICINE

## 2025-04-21 PROCEDURE — 99214 OFFICE O/P EST MOD 30 MIN: CPT | Performed by: INTERNAL MEDICINE

## 2025-04-21 PROCEDURE — 80053 COMPREHEN METABOLIC PANEL: CPT | Performed by: INTERNAL MEDICINE

## 2025-04-21 PROCEDURE — 1160F RVW MEDS BY RX/DR IN RCRD: CPT | Performed by: INTERNAL MEDICINE

## 2025-04-21 PROCEDURE — 1159F MED LIST DOCD IN RCRD: CPT | Performed by: INTERNAL MEDICINE

## 2025-04-21 PROCEDURE — 3008F BODY MASS INDEX DOCD: CPT | Performed by: INTERNAL MEDICINE

## 2025-04-21 PROCEDURE — 3075F SYST BP GE 130 - 139MM HG: CPT | Performed by: INTERNAL MEDICINE

## 2025-04-21 PROCEDURE — 1126F AMNT PAIN NOTED NONE PRSNT: CPT | Performed by: INTERNAL MEDICINE

## 2025-04-21 PROCEDURE — 3079F DIAST BP 80-89 MM HG: CPT | Performed by: INTERNAL MEDICINE

## 2025-04-21 ASSESSMENT — PAIN SCALES - GENERAL: PAINLEVEL_OUTOF10: 0-NO PAIN

## 2025-04-21 NOTE — PROGRESS NOTES
Patient Visit Information:   Visit Type: Follow Up Visit      Cancer History:   Treatment Synopsis:    Chronic lymphocytic leukemia diagnosed 2010.   2010: Bone marrow showed chronic lymphocytic leukemia/small lymphocytic lymphoma with CD19,CD20, CD5, and CD23 positive and lambda immunoglobulin light chains.   CD38 was moderate. 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  2015: Developed significant adenopathy, B symptoms.  July-2015: Fludarabine/rituximab x 6 cycles.  August-2019: Bendamustine/rituximab x 4 cycles.  Remission.  10/19/2021: WBC 5.2.  ALC 0.3.  Hemoglobin 12.2.  Platelets 138,000.   4/15/2022: WBC 4.7.  A.7.  Hemoglobin 12.1.  Platelets 156,000.  In remission.  10/15/2022: WBC 4.2  A.13 Hemoglobin 11.8.  Platelets 161,000.   2023: WBC 4.5.  ALC 0.9.  Hemoglobin 11.0.  Platelets 167,000.  CMP, iron studies: Normal.   10/12/23 , WBC count 3.8, hemoglobin 13.3, platelets 200   4/15/24 ,   WBC count 3.7, hemoglobin 11.8, platelets 196, 54% neutrophil, 32% lymphocyte  10/14/24 , WBC count 4.3, hemoglobin 12.5, platelets 248, neutrophil 50%, lymphocyte 34%, ALC 1.47  25 , WBC count 4.9, hemoglobin 12.4, platelets 222  History of Present Illness:      ID Statement:    REINA KNOWLES is a 72 year old Female        Chief Complaint: Office visit for treatment of chronic  lymphocytic leukemia.   Interval History:    25  Reina Knowles is a 73-year-old female, with history of CLL, who presents for continuation of care.  She is doing very well, no B symptoms.  No nausea vomiting no abdominal pain, no rectal bleed    pancreatic lesions have been stable which are documented by previous MRI done in 2023    Past medical history: CLL diagnosed , begin treatment with FIR  and received 4 cycles BR , as described above.  In remission.  History of hypogammaglobulinemia, receives IVIG infusions.  Positive Cologuard test, pancreatic lesion (monitored  by   [facility]), neurologic condition, cataract surgery.  COVID-19 infection, long-haul, December 2020-August 2021. Colonoscopy 4/20/22- two polyps (benign)- recommended follow-up in 3-5 years.      Review of Systems:   Review of Systems:    Constitutional: Feeling well, no fatigue or weakness.  Head and neck: No headaches or dizziness.     HEENT: No sore throat or sinusitis.  Hearing is normal eyesight is good.  Cardiac: No chest pain or palpitations.  Pulmonary: no cough or shortness of breath.  GI: Appetite is good and weight stable.  No constipation or diarrhea.  No abdominal pain  Genitourinary: No frequency or urgency.  No polyuria or dysuria.  Musculocutaneous: No arthritis.  Endocrine: No diabetes no thyroid disease.  Skin: No rash or itching.  Neuromuscular : no fainting or dizziness.  No history of convulsions.  No tingling or numbness.           Allergies and Intolerances:       Allergies:         azithromycin: Drug, Unknown, Active     Outpatient Medication Profile:  * Patient Currently Takes Medications as of 14-Apr-2023 12:49 documented in Structured Notes         Depakote  mg oral tablet, extended release : Last Dose Taken:  , 1 tab(s) orally once a day         propranolol 40 mg oral tablet: Last Dose Taken:  , 1 tab(s) orally 2  times a day             Medical History:         Seizure: ICD-10: R56.9, Status: Active         COVID-19: ICD-10: U07.1, Status: Active         Hypogammaglobulinemia: ICD-10: D80.1, Status: Active         Chronic cough: ICD-10: R05, Status: Active         Chronic sinusitis: ICD-10: J32.9, Status: Active         Neutropenia: ICD-10: D70.9, Status: Active         Chronic lymphocytic leukemia: ICD-10: C91.10, Status: Active       Surg History:         History of lymph node biopsy: ICD-10: Z98.89, Status: Active     Family History: Family history of neoplasm of brain  (Maternal Grandmother Age Unknown)      Social History:   Social Substance History:  ·  Smoking Status never  smoker   ·  Additional History     Social history: She works at a bank.(1)           Vitals and Measurements:   Vitals: Temp: 36.2  HR: 58  RR: 16  BP: 118/64  SPO2%:   98   Measurements: HT(cm): 167.9  WT(kg): 98.4  BSA: 2.14   BMI:  34.9      Physical Exam:      Constitutional: Well developed, awake/alert/oriented  x3.   Eyes: PERRL, EOMI, clear sclera.   ENMT: No external lesions noted.   Head/Neck: Neck with normal movement.  No mass, adenopathy  or tenderness.  No JVD. Trachea midline.   Respiratory/Thorax: Thorax symmetric. Clear to auscultation.   No wheezes, rales, rhonchi.   Cardiovascular: Regular, rate and rhythm. No murmur.   No rubs or gallops.   Gastrointestinal: Nondistended.  Normal bowel sounds.   Soft, non-tender. No rebound or guarding. No masses palpable.  No palpable hepatomegaly, splenomegaly.   Musculoskeletal: ROM intact.  No significant joint  swelling. Adequate strength. No significant deformity.   Extremities: Mild distal lower extremity edema, chronic,  stable per patient.   Neurological: Alert and oriented x3. Senses and cranial  nerves grossly intact. No focal motor deficits noted. No focal sensory deficits.   Lymphatic: No palpably significant lymphadenopathy  in the neck, supraclavicular, axillary areas or other areas.   Psychological: Appropriate mood and behavior.   Skin: Warm and dry, no rashes, no suspicious lesions.         Lab Results:           09:35  (4/21/25) 6 mo ago  (10/14/24) 1 yr ago  (4/15/24) 1 yr ago  (10/12/23) 2 yr ago  (4/14/23) 2 yr ago  (10/14/22) 3 yr ago  (4/15/22)    WBC  4.4 - 11.3 x10*3/uL 4.9 4.3 Low  3.7 Low  3.8 Low  4.5 R 4.2 Low  R 4.7 R   RBC  4.00 - 5.20 x10*6/uL 4.66 4.68 4.17 4.85 4.01 R 4.24 R 4.45 R   Hemoglobin  12.0 - 16.0 g/dL 12.4 12.5 11.8 Low  13.3 11.0 Low  11.8 Low  12.1   Hematocrit  36.0 - 46.0 % 38.5 39.9 36.3 41.2 34.3 Low  36.7 38.2   MCV  80 - 100 fL 83 85 87 85 86 87 86   MCH  26.0 - 34.0 pg 26.6 26.7 28.3 27.4      MCHC  32.0 -  "36.0 g/dL 32.2 31.3 Low  32.5 32.3 32.1 32.2 31.7 Low    RDW  11.5 - 14.5 % 14.5 13.4 14.1 14.5 14.1 15.5 High  14.0   Platelets  150 - 450 x10*3/uL 222                 Assessment and Plan:   Assessment:        1.  Chronic lymphocytic leukemia, IGVH mutated, diagnosed 2010, s/p chemotherapy with FR 2015 and BR 2019/20.  In remission.      2.  Abnormal Cologuard test. Colonoscopy 4/20/2022- 2 polyps (benign)- recommended follow-up 3-5 years.     3.  History of pancreatic lesion, reportedly stable.      4.  Multiple medical problems.       5.  Slowly progressing anemia, rule out due to chronic disease, occult blood loss or bone marrow insufficiency.     Plan: In terms of chronic lymphocytic leukemia no evidence of progression of disease, lab result discussed with patient follow-up after 6 months.  I will schedule for screening mammogram.  Colonoscopy will be repeated next year, I will make arrangement on next visit.      Total time =30 minutes. 50% or more of this time was spent in counseling and/or coordination of care including reviewing medical history/radiology/labs, examining patient, formulating outlined plan  with team, and discussing plan with patient/family.  I reviewed patient's chart including but not limited to labs, imaging, surgical/procedure notes, pathology, hospital notes, doctor's notes.                                            Patient Instructions:      Instructions Type: nutrition            Note Recipients: Ej Morgan MD Yanelli, Emmanuel, DO   Select \"Yes\" when ready to send to Provider(s) Listed Above:  Note sent to providers named above     "

## 2025-04-21 NOTE — PATIENT INSTRUCTIONS
Follow up visit for history of CLL.     Mammogram to be scheduled.     Follow up with Dr. Zhao in 6 months.     Call the office at [phone] with questions or needs.  You may also contact your nurse or doctor with non-urgent issues by sending a [employer] message.

## 2025-04-23 ENCOUNTER — TELEPHONE (OUTPATIENT)
Dept: PRIMARY CARE | Facility: CLINIC | Age: 75
End: 2025-04-23
Payer: MEDICARE

## 2025-04-23 DIAGNOSIS — B00.1 RECURRENT HERPES LABIALIS: Primary | ICD-10-CM

## 2025-04-23 DIAGNOSIS — I87.2 CHRONIC VENOUS INSUFFICIENCY OF LOWER EXTREMITY: ICD-10-CM

## 2025-04-23 RX ORDER — FUROSEMIDE 20 MG/1
20 TABLET ORAL DAILY
Qty: 90 TABLET | Refills: 1 | Status: SHIPPED | OUTPATIENT
Start: 2025-04-23

## 2025-04-23 RX ORDER — VALACYCLOVIR HYDROCHLORIDE 1 G/1
2000 TABLET, FILM COATED ORAL 2 TIMES DAILY
Qty: 4 TABLET | Refills: 3 | Status: SHIPPED | OUTPATIENT
Start: 2025-04-23 | End: 2025-04-24

## 2025-04-23 NOTE — TELEPHONE ENCOUNTER
Patient called to request medication refill.    Last appointment with our providers:01/13/2025    Next appointment with our providers:07/14/2025    Name of Medication:Furosemide 20 mg tablet        Pharmacy:[facility]

## 2025-05-01 ENCOUNTER — HOSPITAL ENCOUNTER (OUTPATIENT)
Dept: RADIOLOGY | Facility: CLINIC | Age: 75
Discharge: HOME | End: 2025-05-01
Payer: MEDICARE

## 2025-05-01 VITALS — HEIGHT: 66 IN | BODY MASS INDEX: 32.62 KG/M2 | WEIGHT: 203 LBS

## 2025-05-01 DIAGNOSIS — Z12.31 SCREENING MAMMOGRAM FOR BREAST CANCER: ICD-10-CM

## 2025-05-01 DIAGNOSIS — C91.11 CHRONIC LYMPHOCYTIC LEUKEMIA IN REMISSION (MULTI): ICD-10-CM

## 2025-05-01 PROCEDURE — 77063 BREAST TOMOSYNTHESIS BI: CPT | Performed by: RADIOLOGY

## 2025-05-01 PROCEDURE — 77067 SCR MAMMO BI INCL CAD: CPT

## 2025-05-01 PROCEDURE — 77067 SCR MAMMO BI INCL CAD: CPT | Performed by: RADIOLOGY

## 2025-07-14 ENCOUNTER — APPOINTMENT (OUTPATIENT)
Dept: PRIMARY CARE | Facility: CLINIC | Age: 75
End: 2025-07-14
Payer: MEDICARE

## 2025-09-03 ENCOUNTER — APPOINTMENT (OUTPATIENT)
Dept: PRIMARY CARE | Facility: CLINIC | Age: 75
End: 2025-09-03
Payer: MEDICARE

## 2026-01-13 ENCOUNTER — APPOINTMENT (OUTPATIENT)
Dept: PRIMARY CARE | Facility: CLINIC | Age: 76
End: 2026-01-13
Payer: MEDICARE